# Patient Record
Sex: FEMALE | Race: WHITE | NOT HISPANIC OR LATINO | Employment: FULL TIME | URBAN - METROPOLITAN AREA
[De-identification: names, ages, dates, MRNs, and addresses within clinical notes are randomized per-mention and may not be internally consistent; named-entity substitution may affect disease eponyms.]

---

## 2023-10-05 ENCOUNTER — TELEPHONE (OUTPATIENT)
Dept: GASTROENTEROLOGY | Facility: CLINIC | Age: 58
End: 2023-10-05
Payer: COMMERCIAL

## 2023-10-05 NOTE — TELEPHONE ENCOUNTER
PT said she has been having trouble eating it hurts going down, feels stuck. She said she has an EGD on the 18th was wondering if there was anything [facility] could do for her before then.

## 2023-10-06 NOTE — TELEPHONE ENCOUNTER
If she's already taking PPI BID there's not much else I can do besides giving her some BMX solution which I will prescribe in case she wants it.

## 2023-10-11 DIAGNOSIS — R13.19 ESOPHAGEAL DYSPHAGIA: Primary | ICD-10-CM

## 2023-10-11 RX ORDER — SUCRALFATE 1 G/10ML
1 SUSPENSION ORAL
Qty: 1200 ML | Refills: 11 | Status: SHIPPED | OUTPATIENT
Start: 2023-10-11 | End: 2023-12-05 | Stop reason: ALTCHOICE

## 2023-10-17 ENCOUNTER — ANESTHESIA EVENT (OUTPATIENT)
Dept: OPERATING ROOM | Facility: HOSPITAL | Age: 58
End: 2023-10-17
Payer: COMMERCIAL

## 2023-10-17 PROBLEM — K30 FUNCTIONAL DYSPEPSIA: Status: ACTIVE | Noted: 2023-10-17

## 2023-10-17 PROBLEM — I49.9 CARDIAC RHYTHM DISORDER OR DISTURBANCE OR CHANGE: Status: ACTIVE | Noted: 2023-10-17

## 2023-10-17 PROBLEM — S90.30XA FOOT CONTUSION: Status: ACTIVE | Noted: 2023-10-17

## 2023-10-17 PROBLEM — M25.473 ANKLE EDEMA: Status: ACTIVE | Noted: 2023-10-17

## 2023-10-17 PROBLEM — R00.2 PALPITATIONS: Status: ACTIVE | Noted: 2023-10-17

## 2023-10-17 PROBLEM — R13.10 DYSPHAGIA: Status: ACTIVE | Noted: 2023-10-17

## 2023-10-17 PROBLEM — K21.9 GERD (GASTROESOPHAGEAL REFLUX DISEASE): Status: ACTIVE | Noted: 2023-10-17

## 2023-10-17 PROBLEM — I10 HYPERTENSIVE DISORDER: Status: ACTIVE | Noted: 2023-10-17
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
## 2023-10-17 PROBLEM — K90.89 BILE SALT-INDUCED DIARRHEA (HHS-HCC): Status: ACTIVE | Noted: 2023-10-17

## 2023-10-17 PROBLEM — R60.0 LOWER LEG EDEMA: Status: ACTIVE | Noted: 2023-10-17

## 2023-10-17 PROBLEM — S80.00XA CONTUSION OF KNEE: Status: ACTIVE | Noted: 2023-10-17

## 2023-10-17 PROBLEM — R07.9 CHEST PAIN: Status: ACTIVE | Noted: 2023-10-17

## 2023-10-17 PROBLEM — I10 BENIGN ESSENTIAL HYPERTENSION: Status: ACTIVE | Noted: 2023-10-17

## 2023-10-17 PROBLEM — S99.929A FOOT INJURY: Status: ACTIVE | Noted: 2023-10-17

## 2023-10-17 PROBLEM — M79.89 LEG SWELLING: Status: ACTIVE | Noted: 2023-10-17

## 2023-10-17 PROBLEM — R00.0 TACHYCARDIA: Status: ACTIVE | Noted: 2023-10-17

## 2023-10-17 PROBLEM — I20.9 ANGINA PECTORIS (CMS-HCC): Status: ACTIVE | Noted: 2023-10-17

## 2023-10-17 PROBLEM — K50.00 TERMINAL ILEITIS (MULTI): Status: ACTIVE | Noted: 2023-10-17
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
## 2023-10-17 RX ORDER — METOPROLOL TARTRATE 50 MG/1
0.5 TABLET ORAL EVERY MORNING
COMMUNITY
End: 2023-10-18

## 2023-10-17 RX ORDER — BUTALBITAL, ACETAMINOPHEN, CAFFEINE AND CODEINE PHOSPHATE 50; 325; 40; 30 MG/1; MG/1; MG/1; MG/1
1 CAPSULE ORAL EVERY 4 HOURS PRN
COMMUNITY

## 2023-10-17 RX ORDER — DILTIAZEM HYDROCHLORIDE 120 MG/1
1 TABLET, FILM COATED ORAL DAILY
COMMUNITY
End: 2024-03-25 | Stop reason: ALTCHOICE

## 2023-10-17 RX ORDER — MONTELUKAST SODIUM 4 MG/1
1 TABLET, CHEWABLE ORAL DAILY
COMMUNITY
Start: 2020-12-03 | End: 2024-03-25 | Stop reason: ALTCHOICE

## 2023-10-17 RX ORDER — TOPIRAMATE 25 MG/1
1 TABLET ORAL NIGHTLY
COMMUNITY
End: 2023-12-05 | Stop reason: ALTCHOICE

## 2023-10-17 RX ORDER — CITALOPRAM 40 MG/1
1 TABLET, FILM COATED ORAL DAILY
COMMUNITY
End: 2024-03-25 | Stop reason: ALTCHOICE

## 2023-10-17 RX ORDER — IBUPROFEN 200 MG
2 TABLET ORAL EVERY 6 HOURS PRN
COMMUNITY
End: 2024-05-15 | Stop reason: HOSPADM

## 2023-10-17 RX ORDER — INDOMETHACIN 25 MG/1
1 CAPSULE ORAL EVERY 8 HOURS
COMMUNITY
End: 2024-03-25 | Stop reason: ALTCHOICE

## 2023-10-17 RX ORDER — AMITRIPTYLINE HYDROCHLORIDE 10 MG/1
1 TABLET, FILM COATED ORAL NIGHTLY
COMMUNITY
Start: 2020-12-03 | End: 2024-03-25 | Stop reason: ALTCHOICE

## 2023-10-17 RX ORDER — PANTOPRAZOLE SODIUM 40 MG/1
40 TABLET, DELAYED RELEASE ORAL 2 TIMES DAILY
COMMUNITY

## 2023-10-17 RX ORDER — FLUTICASONE PROPIONATE 50 MCG
1 SPRAY, SUSPENSION (ML) NASAL 2 TIMES DAILY
COMMUNITY
Start: 2023-03-29 | End: 2024-03-25 | Stop reason: ALTCHOICE

## 2023-10-17 RX ORDER — IPRATROPIUM BROMIDE 21 UG/1
2 SPRAY, METERED NASAL 3 TIMES DAILY
COMMUNITY
Start: 2023-03-29 | End: 2024-03-25 | Stop reason: ALTCHOICE

## 2023-10-17 RX ORDER — CYCLOBENZAPRINE HCL 5 MG
1 TABLET ORAL AS NEEDED
COMMUNITY
End: 2024-05-15 | Stop reason: HOSPADM

## 2023-10-17 RX ORDER — ONDANSETRON 4 MG/1
1 TABLET, ORALLY DISINTEGRATING ORAL EVERY 6 HOURS
COMMUNITY
Start: 2023-03-27

## 2023-10-17 RX ORDER — CYCLOBENZAPRINE HCL 10 MG
TABLET ORAL
COMMUNITY
Start: 2020-12-03 | End: 2024-05-15 | Stop reason: HOSPADM

## 2023-10-17 RX ORDER — METOPROLOL SUCCINATE 50 MG/1
1 TABLET, EXTENDED RELEASE ORAL 2 TIMES DAILY
Status: ON HOLD | COMMUNITY
Start: 2018-02-13 | End: 2024-05-14 | Stop reason: ALTCHOICE

## 2023-10-17 RX ORDER — LORAZEPAM 0.5 MG/1
1 TABLET ORAL NIGHTLY PRN
COMMUNITY
End: 2024-03-25 | Stop reason: ALTCHOICE

## 2023-10-17 RX ORDER — BUMETANIDE 1 MG/1
TABLET ORAL
COMMUNITY
Start: 2020-12-03 | End: 2024-03-25 | Stop reason: ALTCHOICE

## 2023-10-17 RX ORDER — METOPROLOL TARTRATE 25 MG/1
25 TABLET, FILM COATED ORAL 2 TIMES DAILY
COMMUNITY
Start: 2023-08-25 | End: 2024-03-25 | Stop reason: ALTCHOICE

## 2023-10-17 RX ORDER — BUTALBITAL, ACETAMINOPHEN AND CAFFEINE 300; 40; 50 MG/1; MG/1; MG/1
CAPSULE ORAL
COMMUNITY
Start: 2020-12-03

## 2023-10-18 ENCOUNTER — HOSPITAL ENCOUNTER (OUTPATIENT)
Dept: OPERATING ROOM | Facility: HOSPITAL | Age: 58
Discharge: HOME | End: 2023-10-18
Payer: COMMERCIAL

## 2023-10-18 ENCOUNTER — ANESTHESIA (OUTPATIENT)
Dept: OPERATING ROOM | Facility: HOSPITAL | Age: 58
End: 2023-10-18
Payer: COMMERCIAL

## 2023-10-18 ENCOUNTER — APPOINTMENT (OUTPATIENT)
Dept: OPERATING ROOM | Facility: HOSPITAL | Age: 58
End: 2023-10-18
Payer: COMMERCIAL

## 2023-10-18 VITALS
WEIGHT: 175.71 LBS | BODY MASS INDEX: 35.42 KG/M2 | RESPIRATION RATE: 16 BRPM | DIASTOLIC BLOOD PRESSURE: 68 MMHG | HEART RATE: 72 BPM | OXYGEN SATURATION: 98 % | SYSTOLIC BLOOD PRESSURE: 117 MMHG | HEIGHT: 59 IN | TEMPERATURE: 97.5 F

## 2023-10-18 DIAGNOSIS — Z12.0 ENCOUNTER FOR SCREENING FOR MALIGNANT NEOPLASM OF STOMACH: ICD-10-CM

## 2023-10-18 PROCEDURE — A43239 PR EDG TRANSORAL BIOPSY SINGLE/MULTIPLE: Performed by: REGISTERED NURSE

## 2023-10-18 PROCEDURE — 3700000002 HC GENERAL ANESTHESIA TIME - EACH INCREMENTAL 1 MINUTE: Performed by: REGISTERED NURSE

## 2023-10-18 PROCEDURE — 88305 TISSUE EXAM BY PATHOLOGIST: CPT | Mod: TC | Performed by: INTERNAL MEDICINE

## 2023-10-18 PROCEDURE — 88305 TISSUE EXAM BY PATHOLOGIST: CPT | Mod: TC,SUR | Performed by: INTERNAL MEDICINE

## 2023-10-18 PROCEDURE — 2500000004 HC RX 250 GENERAL PHARMACY W/ HCPCS (ALT 636 FOR OP/ED): Performed by: REGISTERED NURSE

## 2023-10-18 PROCEDURE — 88305 TISSUE EXAM BY PATHOLOGIST: CPT | Performed by: PATHOLOGY

## 2023-10-18 PROCEDURE — 2500000004 HC RX 250 GENERAL PHARMACY W/ HCPCS (ALT 636 FOR OP/ED): Performed by: ANESTHESIOLOGY

## 2023-10-18 PROCEDURE — 7100000010 HC PHASE TWO TIME - EACH INCREMENTAL 1 MINUTE: Performed by: REGISTERED NURSE

## 2023-10-18 PROCEDURE — 7100000009 HC PHASE TWO TIME - INITIAL BASE CHARGE: Performed by: REGISTERED NURSE

## 2023-10-18 PROCEDURE — 43239 EGD BIOPSY SINGLE/MULTIPLE: CPT | Performed by: INTERNAL MEDICINE

## 2023-10-18 PROCEDURE — 3600000002 HC OR TIME - INITIAL BASE CHARGE - PROCEDURE LEVEL TWO: Performed by: REGISTERED NURSE

## 2023-10-18 PROCEDURE — 3600000007 HC OR TIME - EACH INCREMENTAL 1 MINUTE - PROCEDURE LEVEL TWO: Performed by: REGISTERED NURSE

## 2023-10-18 PROCEDURE — 3700000001 HC GENERAL ANESTHESIA TIME - INITIAL BASE CHARGE: Performed by: REGISTERED NURSE

## 2023-10-18 RX ORDER — PROPOFOL 10 MG/ML
INJECTION, EMULSION INTRAVENOUS AS NEEDED
Status: DISCONTINUED | OUTPATIENT
Start: 2023-10-18 | End: 2023-10-18

## 2023-10-18 RX ORDER — FENTANYL CITRATE 50 UG/ML
INJECTION, SOLUTION INTRAMUSCULAR; INTRAVENOUS AS NEEDED
Status: DISCONTINUED | OUTPATIENT
Start: 2023-10-18 | End: 2023-10-18

## 2023-10-18 RX ORDER — MIDAZOLAM HYDROCHLORIDE 1 MG/ML
INJECTION INTRAMUSCULAR; INTRAVENOUS AS NEEDED
Status: DISCONTINUED | OUTPATIENT
Start: 2023-10-18 | End: 2023-10-18

## 2023-10-18 RX ORDER — SODIUM CHLORIDE, SODIUM LACTATE, POTASSIUM CHLORIDE, CALCIUM CHLORIDE 600; 310; 30; 20 MG/100ML; MG/100ML; MG/100ML; MG/100ML
100 INJECTION, SOLUTION INTRAVENOUS CONTINUOUS
Status: DISCONTINUED | OUTPATIENT
Start: 2023-10-18 | End: 2023-10-20 | Stop reason: HOSPADM

## 2023-10-18 RX ADMIN — MIDAZOLAM HYDROCHLORIDE 2 MG: 1 INJECTION, SOLUTION INTRAMUSCULAR; INTRAVENOUS at 13:55

## 2023-10-18 RX ADMIN — FENTANYL CITRATE 50 MCG: 50 INJECTION, SOLUTION INTRAMUSCULAR; INTRAVENOUS at 13:55

## 2023-10-18 RX ADMIN — SODIUM CHLORIDE, POTASSIUM CHLORIDE, SODIUM LACTATE AND CALCIUM CHLORIDE 100 ML/HR: 600; 310; 30; 20 INJECTION, SOLUTION INTRAVENOUS at 12:14

## 2023-10-18 RX ADMIN — PROPOFOL 80 MG: 10 INJECTION, EMULSION INTRAVENOUS at 13:56

## 2023-10-18 RX ADMIN — PROPOFOL 20 MG: 10 INJECTION, EMULSION INTRAVENOUS at 14:03

## 2023-10-18 RX ADMIN — PROPOFOL 30 MG: 10 INJECTION, EMULSION INTRAVENOUS at 14:01

## 2023-10-18 ASSESSMENT — PAIN SCALES - GENERAL
PAIN_LEVEL: 0
PAINLEVEL_OUTOF10: 0 - NO PAIN

## 2023-10-18 ASSESSMENT — PAIN - FUNCTIONAL ASSESSMENT: PAIN_FUNCTIONAL_ASSESSMENT: 0-10

## 2023-10-18 ASSESSMENT — ENCOUNTER SYMPTOMS
DIARRHEA: 1
ROS GI COMMENTS: DYSPHAGIA, ODYNOPHAGIA
ABDOMINAL PAIN: 1

## 2023-10-18 ASSESSMENT — COLUMBIA-SUICIDE SEVERITY RATING SCALE - C-SSRS
1. IN THE PAST MONTH, HAVE YOU WISHED YOU WERE DEAD OR WISHED YOU COULD GO TO SLEEP AND NOT WAKE UP?: NO
2. HAVE YOU ACTUALLY HAD ANY THOUGHTS OF KILLING YOURSELF?: NO
6. HAVE YOU EVER DONE ANYTHING, STARTED TO DO ANYTHING, OR PREPARED TO DO ANYTHING TO END YOUR LIFE?: NO

## 2023-10-18 NOTE — ANESTHESIA POSTPROCEDURE EVALUATION
Patient: Marleni Davey    Procedure Summary       Date: 10/18/23 Room / Location: [facility] OR    Anesthesia Start: 1354 Anesthesia Stop: 1414    Procedure: EGD Diagnosis: Encounter for screening for malignant neoplasm of stomach    Scheduled Providers: Gwen Niño MD Responsible Provider: TEREZA Mccray    Anesthesia Type: MAC ASA Status: 2            Anesthesia Type: MAC  Anesthesia Post Evaluation    Patient location during evaluation: bedside  Patient participation: complete - patient participated  Level of consciousness: awake and alert  Pain score: 0  Airway patency: patent  Cardiovascular status: acceptable and hemodynamically stable  Respiratory status: acceptable and room air  Hydration status: acceptable        There were no known notable events for this encounter.

## 2023-10-18 NOTE — ANESTHESIA PREPROCEDURE EVALUATION
Patient: Marleni Davey    Procedure Information       Anesthesia Start Date/Time: 10/18/23 1354    Scheduled providers: Gwen Niño MD    Procedure: EGD    Location: [facility] OR            Relevant Problems   Cardiovascular   (+) Angina pectoris (CMS/HCC)   (+) Benign essential hypertension   (+) Cardiac rhythm disorder or disturbance or change   (+) Chest pain   (+) Hypertensive disorder      GI   (+) GERD (gastroesophageal reflux disease)      Neuro/Psych   (+) Anxiety       Clinical information reviewed:    Allergies                NPO Detail:  NPO/Void Status  Date of Last Solid: 10/17/23  Time of Last Solid: 1900  Last Intake Type: Food         Physical Exam    Airway  Mallampati: II  Neck ROM: full     Cardiovascular - normal exam     Dental - normal exam     Pulmonary - normal exam     Abdominal - normal exam             Anesthesia Plan    ASA 2     MAC     intravenous induction   Anesthetic plan and risks discussed with patient.

## 2023-10-18 NOTE — DISCHARGE INSTRUCTIONS

## 2023-10-18 NOTE — H&P
"History Of Present Illness  Marleni Davey is a 58 y.o. female presenting with with abdominal pain, dysphagia/odynophagia.  Previous EGD in 2020 at [facility].  Biopsy was unremarkable.  SP cholecystectomy     Past Medical History  No past medical history on file.    Surgical History  No past surgical history on file.     Social History  She has no history on file for tobacco use, alcohol use, and drug use.    Family History  No family history on file.     Allergies  Eletriptan, Topiramate, Zolmitriptan, Trazodone, Aspirin-acetaminophen-caffeine, Sumatriptan, and Triamcinolone    Review of Systems   Gastrointestinal:  Positive for abdominal pain and diarrhea.        Dysphagia, odynophagia        Physical Exam  Cardiovascular:      Rate and Rhythm: Normal rate and regular rhythm.   Pulmonary:      Effort: Pulmonary effort is normal.      Breath sounds: Normal breath sounds.   Neurological:      Mental Status: She is alert and oriented to person, place, and time.          Last Recorded Vitals  Blood pressure 118/75, pulse 68, temperature 37.2 °C (99 °F), resp. rate 16, height 1.499 m (4' 11\"), weight 79.7 kg (175 lb 11.3 oz), SpO2 100 %.    Relevant Results             Assessment/Plan   Active Problems:  There are no active Hospital Problems.      Abdominal pain  Diarrhea  Dysphagia, odynophagia.    EGD             Gwen Niño MD    "

## 2023-10-24 LAB
LABORATORY COMMENT REPORT: NORMAL
PATH REPORT.COMMENTS IMP SPEC: NORMAL
PATH REPORT.FINAL DX SPEC: NORMAL
PATH REPORT.GROSS SPEC: NORMAL
PATH REPORT.RELEVANT HX SPEC: NORMAL
PATH REPORT.TOTAL CANCER: NORMAL

## 2023-10-25 DIAGNOSIS — R13.19 ESOPHAGEAL DYSPHAGIA: Primary | ICD-10-CM

## 2023-10-25 NOTE — PROGRESS NOTES
Discussed EGD findings with patient. Small HH, gastritis. Biopsies normal. She is taking PPI but not carafate as it made her nauseous and did not help symptoms. She is chewing her food carefully and not taking in large amounts at a time. We will plan for esophagram to further evaluate dysphagia symptoms.

## 2023-11-14 ENCOUNTER — HOSPITAL ENCOUNTER (OUTPATIENT)
Dept: RADIOLOGY | Facility: HOSPITAL | Age: 58
Discharge: HOME | End: 2023-11-14
Payer: COMMERCIAL

## 2023-11-14 DIAGNOSIS — R13.19 ESOPHAGEAL DYSPHAGIA: ICD-10-CM

## 2023-11-14 PROCEDURE — 3430000001 HC RX 343 DIAGNOSTIC RADIOPHARMACEUTICALS: Performed by: PHYSICIAN ASSISTANT

## 2023-11-14 PROCEDURE — 2500000001 HC RX 250 WO HCPCS SELF ADMINISTERED DRUGS (ALT 637 FOR MEDICARE OP): Performed by: PHYSICIAN ASSISTANT

## 2023-11-14 PROCEDURE — 74221 X-RAY XM ESOPHAGUS 2CNTRST: CPT | Performed by: STUDENT IN AN ORGANIZED HEALTH CARE EDUCATION/TRAINING PROGRAM

## 2023-11-14 PROCEDURE — A9698 NON-RAD CONTRAST MATERIALNOC: HCPCS | Performed by: PHYSICIAN ASSISTANT

## 2023-11-14 PROCEDURE — 74220 X-RAY XM ESOPHAGUS 1CNTRST: CPT

## 2023-11-14 RX ADMIN — ANTACID/ANTIFLATULENT 1 PACKET: 380; 550; 10; 10 GRANULE, EFFERVESCENT ORAL at 09:29

## 2023-11-14 RX ADMIN — BARIUM SULFATE 700 MG: 700 TABLET ORAL at 09:28

## 2023-11-14 RX ADMIN — BARIUM SULFATE 50 ML: 980 POWDER, FOR SUSPENSION ORAL at 09:27

## 2023-11-14 RX ADMIN — BARIUM SULFATE 70 ML: 960 POWDER, FOR SUSPENSION ORAL at 09:31

## 2023-11-15 DIAGNOSIS — K44.9 HIATAL HERNIA: Primary | ICD-10-CM

## 2023-11-15 NOTE — PROGRESS NOTES
Discussed esophagram with patient. Small hiatal hernia with mild esophageal reflux, however she is on maximum dose of PPI therapy. Therefore, I recommend seeing Dr. Jones to discuss hiatal hernia repair and consider [facility] for motility testing. She is in agreement. Referral placed. She also reports that there was an enlarged lymph node near stomach which was removed w/ her GB. This was at [facility]. I asked she send records over for me to review further and consider repeat CT scan.

## 2023-12-05 ENCOUNTER — OFFICE VISIT (OUTPATIENT)
Dept: GASTROENTEROLOGY | Facility: CLINIC | Age: 58
End: 2023-12-05
Payer: COMMERCIAL

## 2023-12-05 VITALS — BODY MASS INDEX: 36.08 KG/M2 | HEIGHT: 59 IN | WEIGHT: 179 LBS

## 2023-12-05 DIAGNOSIS — R10.84 GENERALIZED ABDOMINAL PAIN: Primary | ICD-10-CM

## 2023-12-05 PROCEDURE — 99214 OFFICE O/P EST MOD 30 MIN: CPT | Performed by: PHYSICIAN ASSISTANT

## 2023-12-05 NOTE — PROGRESS NOTES
Subjective   Patient ID: Marleni Davey is a 58 y.o. female who presents for Follow-up (EGD ).  HPI  58-year-old female presents for follow-up dysphagia.  She has been on PPI twice daily with insufficient response.  She underwent an EGD last month which noted normal duodenum, normal mucosa in the esophagus, polyp in the stomach.  Small hiatal hernia.  Biopsies were within normal limits.  She reports ongoing abdominal pain.  She also has dysphagia as well.  She has made many dietary modifications but has not noticed a significant difference.  Food is still getting hung up in her esophagus and feels like it will take hours to go down.  She underwent an esophagram which showed small hiatal hernia with breakthrough reflux symptoms despite PPI twice daily.  We discussed options such as transition medication or looking into surgical options and she is interested in meeting with a general surgeon.  We also discussed her CT scans from 2020 in 2021.  There was a couple of lymph nodes around her duodenal region that were 2 cm or larger.  This was not noted on her most recent CT scan in February 2023, however there is a lot of inflammation in her abdomen at that point and may have been missed.  She does report abdominal pain which is ongoing.  Abdominal pain-Yes      Bloating-Yes  Abdominal swelling-No     Burping-Yes         Trouble swallowing-Yes         Painful swallowing-No     Feeling full after small meal-Yes     Heartburn-Yes      Nausea-Yes              Regurgitation-Yes  Vomiting-No  Vomiting blood-N/A  Vomiting coffee grounds-N/A    Constipation-Yes  Diarrhea-No  Fecal incontinence-No  Fecal urgency-No   BRBPR-No  Black stools-No  Maroon stools-No   Unintentional weight loss-No   Have you had a Colonoscopy-Yes 3/20/23  Have you had an EGD-Yes 10/18/23    Objective   Physical Exam  @Constitutional: well nourished, well appearing. NAD. Alert and cooperative  Skin: no jaundice   Eyes: anicteric, normal  conjunctiva  ENT: MMM  Pulmonary: easy and nonlabored on RA  Abdomen: soft, NT, ND. No ascites.  MSK: MAEx4  Extremities: no edema  Neuro: aaox3  Psych: appropriate mood and behavior     No visits with results within 1 Month(s) from this visit.   Latest known visit with results is:   Hospital Outpatient Visit on 10/18/2023   Component Date Value Ref Range Status    Case Report 10/18/2023    Final                    Value:Surgical Pathology                                Case: [id number]                                  Authorizing Provider:  Gwen Niño MD    Collected:           10/18/2023 1400              Ordering Location:     [facility]   Received:            10/18/2023 1422                                     OR                                                                           Pathologist:           Elgin Cunha MD                                                           Specimens:   A) - STOMACH ANTRUM BIOPSY                                                                          B) - ESOPHAGOGASTRIC JUNCTION BIOPSY                                                                C) - ESOPHAGUS MID BIOPSY, ESOPHAGUS MID BOPSY                                             FINAL DIAGNOSIS 10/18/2023    Final                    Value:This result contains rich text formatting which cannot be displayed here.      10/18/2023    Final                    Value:This result contains rich text formatting which cannot be displayed here.    Comment 10/18/2023    Final                    Value:This result contains rich text formatting which cannot be displayed here.    Clinical History 10/18/2023    Final                    Value:This result contains rich text formatting which cannot be displayed here.    Gross Description 10/18/2023    Final                    Value:This result contains rich text formatting which cannot be displayed here.       Assessment/Plan     58-year-old female  presents to GI clinic for follow-up dysphagia.  Concern hiatal hernia is the potential cause.    -Refer to general surgery to discuss surgical options for hiatal hernia; discussed she may need manometry.  -Continue PPI twice daily  -CT scan abdomen pelvis with IV contrast for abdominal pain and to provide surveillance for lymphadenopathy around upper abdomen.

## 2023-12-06 ENCOUNTER — OFFICE VISIT (OUTPATIENT)
Dept: SURGERY | Facility: CLINIC | Age: 58
End: 2023-12-06
Payer: COMMERCIAL

## 2023-12-06 VITALS
DIASTOLIC BLOOD PRESSURE: 85 MMHG | HEIGHT: 59 IN | SYSTOLIC BLOOD PRESSURE: 125 MMHG | OXYGEN SATURATION: 94 % | HEART RATE: 73 BPM | BODY MASS INDEX: 36.08 KG/M2 | WEIGHT: 179 LBS

## 2023-12-06 DIAGNOSIS — K21.9 HIATAL HERNIA WITH GASTROESOPHAGEAL REFLUX: Primary | ICD-10-CM

## 2023-12-06 DIAGNOSIS — K44.9 HIATAL HERNIA WITH GASTROESOPHAGEAL REFLUX: Primary | ICD-10-CM

## 2023-12-06 DIAGNOSIS — K44.9 HIATAL HERNIA: ICD-10-CM

## 2023-12-06 PROCEDURE — 99204 OFFICE O/P NEW MOD 45 MIN: CPT | Performed by: SURGERY

## 2023-12-06 PROCEDURE — 3074F SYST BP LT 130 MM HG: CPT | Performed by: SURGERY

## 2023-12-06 PROCEDURE — 3079F DIAST BP 80-89 MM HG: CPT | Performed by: SURGERY

## 2023-12-06 NOTE — PROGRESS NOTES
General Surgery History and Physical    Referring Provider:  MD Hosea Hunter Lauren E, PA*     Chief Complaint:  Chief Complaint   Patient presents with    New Patient Visit     Evaluation of hiatal hernia-Egd done on 10-18-23       History of Present Illness:  This is a 58 y.o. female who presents with refractory gastroesophageal reflux disease symptoms.  She reports that she has been dealing with gastroesophageal reflux disease for many years.  She reports that she originally had complete control of her symptoms with proton pump inhibitors.  She reports that a few months ago she began having breakthrough symptoms, and proceeded with doubling her dose.  She now is taking 40 mg twice a day.  However, she still has some symptoms.  Her symptoms now include dysphagia, where food feels like it gets stuck in her lower esophagus.  She still has heartburn and reflux.  She is unable to eat anything acidic.  She reports that she sleeps in a recliner.  She is already avoiding spicy food, acidic food, caffeine, alcohol, tobacco and large meals.    Past Medical History:  Gastroesophageal reflux disease    Past Surgical History:  Cholecystectomy with lymph node resection  Tubal ligation  Tonsillectomy  Breast biopsy    Medications:  Current Outpatient Medications   Medication Instructions    ACETAMINOPHEN ORAL 2 tablets, oral, Daily PRN    amitriptyline (Elavil) 10 mg tablet 1 tablet, oral, Nightly    bumetanide (Bumex) 1 mg tablet oral    butalbitaL-acetaminop-caf-cod (Fioricet W/Codeine) -45-30 mg capsule 1 capsule, oral, Every 4 hours PRN    butalbital-acetaminophen-caff (Fioricet) -40 mg capsule oral    citalopram (CeleXA) 40 mg tablet 1 tablet, oral, Daily    colestipol (Colestid) 1 gram tablet 1 tablet, oral, Daily    cyclobenzaprine (Flexeril) 10 mg tablet oral    cyclobenzaprine (Flexeril) 5 mg tablet 1 tablet, oral, As needed    dilTIAZem (Cardizem) 120 mg immediate release tablet 1 tablet,  oral, Daily, Before meal    fluticasone (Flonase) 50 mcg/actuation nasal spray 1 spray, 2 times daily    ibuprofen 200 mg tablet 2 tablets, oral, Every 6 hours PRN    indomethacin (Indocin) 25 mg capsule 1 capsule, oral, Every 8 hours, With food    ipratropium (Atrovent) 21 mcg (0.03 %) nasal spray 2 sprays, 3 times daily    LORazepam (Ativan) 0.5 mg tablet 1 tablet, oral, Nightly PRN    metoprolol succinate XL (Toprol-XL) 50 mg 24 hr tablet 1 tablet, oral, Daily    metoprolol tartrate (LOPRESSOR) 25 mg, oral, 2 times daily    nortriptyline (PAMELOR) 10 mg, oral, Nightly    ondansetron ODT (Zofran-ODT) 4 mg disintegrating tablet 1 tablet, oral, Every 6 hours    pantoprazole (PROTONIX) 40 mg, oral, 2 times daily        Allergies:  Allergies   Allergen Reactions    Eletriptan Shortness of breath    Topiramate Other     Change in mental status      Zolmitriptan GI Upset    Trazodone Other     Tingling fingers      Aspirin-Acetaminophen-Caffeine Unknown    Sumatriptan Unknown    Triamcinolone Rash        Family History:  Chronic kidney disease  Myocardial infarction  Hypertension    Social History:  .  Works as a .  Denies tobacco and illicits.  Drinks alcohol twice a month       Review of Systems:  A complete 12 point review of systems was performed and is negative except as noted in the history of present illness.      Vital Signs:  Vitals:    12/06/23 1432   BP: 125/85   Pulse: 73   SpO2: 94%          Physical Exam:  General: No acute distress. Sitting up in bed.   Neuro: Alert and oriented ×3. Follows commands.  Head: Atraumatic  Eyes: Pupils equal reactive to light. Extraocular motions intact.  Ears: Hears normal speaking voice.  Mouth, Nose, Throat: Mucous membranes moist.  Normal dentition.  Neck: Supple. No appreciable masses.  Chest: No crepitus.  No appreciable scars.  Heart: Regular rate and rhythm.  Lung: Clear to auscultation bilaterally.  Vascular: No carotid bruits.  Palpable radial  pulses bilaterally.  Abdomen: Soft. Nondistended. Nontender.  Well-healed laparoscopic incisions  Musculoskeletal: Moves all extremities.  Normal range of motion.  Lymphatic: No palpable lymph nodes.  Skin: No rashes or lesions.  Psychological: Normal affect      Laboratory Values:  None      Imaging:  I have personally reviewed the images and the radiologist's report.  Esophagram: Small hiatal hernia with mild reflux    EGD: Tiny hiatal hernia      Assessment:  This is a 58 y.o. female who presents with dysphagia and gastroesophageal reflux disease refractory to maximum dose proton pump inhibitors as well as the appropriate behavioral changes.  We discussed that in general this would be an indication for a hiatal hernia repair with toupee fundoplication.  However, we would need to proceed with an EMOT and an Bravo first.  She would also need to lose weight to a BMI of less than 35 prior to surgery.  She plans to do so.      Plan:  -- EMOT  -- Bravo while off of antacids for 1 week.  -- Weight loss to a BMI less than 35  -- Follow up to review results.      Kvng Jones MD  General Surgery  Office: [phone]  Fax:     [phone]  3:33 PM   12/06/23

## 2023-12-18 PROCEDURE — 82565 ASSAY OF CREATININE: CPT | Mod: OUT

## 2023-12-19 ENCOUNTER — HOSPITAL ENCOUNTER (OUTPATIENT)
Dept: RADIOLOGY | Facility: HOSPITAL | Age: 58
Discharge: HOME | End: 2023-12-19
Payer: COMMERCIAL

## 2023-12-19 DIAGNOSIS — R10.84 GENERALIZED ABDOMINAL PAIN: ICD-10-CM

## 2023-12-19 PROCEDURE — 74177 CT ABD & PELVIS W/CONTRAST: CPT

## 2023-12-19 PROCEDURE — 74177 CT ABD & PELVIS W/CONTRAST: CPT | Performed by: RADIOLOGY

## 2023-12-19 PROCEDURE — 2550000001 HC RX 255 CONTRASTS: Performed by: PHYSICIAN ASSISTANT

## 2023-12-19 RX ADMIN — IOHEXOL 75 ML: 350 INJECTION, SOLUTION INTRAVENOUS at 09:22

## 2024-01-04 ENCOUNTER — LAB REQUISITION (OUTPATIENT)
Dept: LAB | Facility: HOSPITAL | Age: 59
End: 2024-01-04
Payer: COMMERCIAL

## 2024-01-04 LAB
CREAT SERPL-MCNC: 0.7 MG/DL (ref 0.6–1.3)
GFR SERPL CREATININE-BSD FRML MDRD: >60 ML/MIN/1.73M*2

## 2024-01-06 ENCOUNTER — HOSPITAL ENCOUNTER (EMERGENCY)
Facility: HOSPITAL | Age: 59
Discharge: HOME | End: 2024-01-06
Payer: COMMERCIAL

## 2024-01-06 VITALS
TEMPERATURE: 97.2 F | WEIGHT: 179 LBS | SYSTOLIC BLOOD PRESSURE: 130 MMHG | DIASTOLIC BLOOD PRESSURE: 99 MMHG | OXYGEN SATURATION: 95 % | BODY MASS INDEX: 36.08 KG/M2 | HEART RATE: 84 BPM | HEIGHT: 59 IN | RESPIRATION RATE: 16 BRPM

## 2024-01-06 DIAGNOSIS — S61.012A LACERATION OF LEFT THUMB WITHOUT FOREIGN BODY WITHOUT DAMAGE TO NAIL, INITIAL ENCOUNTER: Primary | ICD-10-CM

## 2024-01-06 PROCEDURE — 12002 RPR S/N/AX/GEN/TRNK2.6-7.5CM: CPT

## 2024-01-06 PROCEDURE — 99282 EMERGENCY DEPT VISIT SF MDM: CPT

## 2024-01-06 PROCEDURE — 99283 EMERGENCY DEPT VISIT LOW MDM: CPT | Mod: 25

## 2024-01-06 ASSESSMENT — LIFESTYLE VARIABLES
HAVE YOU EVER FELT YOU SHOULD CUT DOWN ON YOUR DRINKING: NO
EVER FELT BAD OR GUILTY ABOUT YOUR DRINKING: NO
HAVE PEOPLE ANNOYED YOU BY CRITICIZING YOUR DRINKING: NO
EVER HAD A DRINK FIRST THING IN THE MORNING TO STEADY YOUR NERVES TO GET RID OF A HANGOVER: NO
REASON UNABLE TO ASSESS: YES

## 2024-01-06 ASSESSMENT — PAIN SCALES - GENERAL: PAINLEVEL_OUTOF10: 6

## 2024-01-06 ASSESSMENT — COLUMBIA-SUICIDE SEVERITY RATING SCALE - C-SSRS
2. HAVE YOU ACTUALLY HAD ANY THOUGHTS OF KILLING YOURSELF?: NO
6. HAVE YOU EVER DONE ANYTHING, STARTED TO DO ANYTHING, OR PREPARED TO DO ANYTHING TO END YOUR LIFE?: NO
1. IN THE PAST MONTH, HAVE YOU WISHED YOU WERE DEAD OR WISHED YOU COULD GO TO SLEEP AND NOT WAKE UP?: NO

## 2024-01-06 ASSESSMENT — PAIN - FUNCTIONAL ASSESSMENT: PAIN_FUNCTIONAL_ASSESSMENT: 0-10

## 2024-01-06 ASSESSMENT — PAIN DESCRIPTION - ORIENTATION: ORIENTATION: LEFT

## 2024-01-06 ASSESSMENT — PAIN DESCRIPTION - DESCRIPTORS: DESCRIPTORS: THROBBING

## 2024-01-06 ASSESSMENT — PAIN DESCRIPTION - LOCATION: LOCATION: FINGER (COMMENT WHICH ONE)

## 2024-01-06 ASSESSMENT — PAIN DESCRIPTION - PAIN TYPE: TYPE: ACUTE PAIN

## 2024-01-07 NOTE — ED PROVIDER NOTES
HPI   Chief Complaint   Patient presents with    Laceration       History of present illness:  58-year-old female presents to the emergency room for complaints of thumb laceration.  The patient states that she was using a new knife to cut some steak when she is holding the meat and unfortunately the knife slipped through and cut the tip of her finger.  She is having difficulty controlling the bleeding at home.  She states she last had a tetanus shot just a month ago.  She states that she has no other injuries time takes medications for hypertension and hyperlipidemia.  She denies any other symptoms at time.    Social history: Negative for alcohol and drug use.    Review of systems:   Gen.: No weight loss, fatigue, anorexia, insomnia, fever.   Eyes: No vision loss, double vision  ENT: No pharyngitis, neck pain  Cardiac: No chest pain, palpitations, syncope, near syncope.   Pulmonary: No shortness of breath, cough, hemoptysis.   Heme/lymph: No swollen glands, fever, bleeding.   GI: No abdominal pain, change in bowel habits, melena, hematemesis, hematochezia, nausea, vomiting, diarrhea.   : No discharge, dysuria, frequency, urgency, hematuria.   Musculoskeletal: No limb pain, joint pain, joint swelling.   Skin: No rashes.   Review of systems is otherwise negative unless stated above or in history of present illness.        Physical exam:  General: Vitals noted, no distress. Afebrile.   EENT: No lymphadenopathy   Cardiac: Regular, rate, rhythm, no murmur.   Pulmonary: Lungs clear bilaterally with good aeration. No adventitious breath sounds.   Abdomen: Soft, nonsurgical. Nontender. No peritoneal signs. Normoactive bowel sounds.   Extremities: No peripheral edema. 3 cm laceration is present on the pad of the left thumb does not affect the nailbed at this time, it is not gaping at this time, is not actively bleeding at this time  Skin: No rash.   Neuro: No focal neurologic deficits      Medical decision making:    Testing: Clinical exam  Treatment: Skin glue used to close the wound 3 cm laceration is present on the pad of the left thumb does not affect the nailbed at this time, it is not gaping at this time, is not actively bleeding at this time  Reevaluation:   Plan: Home-going.  Discussed differential. Will follow-up with the primary physician in the next 2-3 days. Return if worse. They understand return precautions and discharge instructions. Patient and family/friend/caregiver are in agreement with this plan. 58-year-old female presents to the emergency room for complaints of thumb laceration.  The patient states that she was using a new knife to cut some steak when she is holding the meat and unfortunately the knife slipped through and cut the tip of her finger.  She is having difficulty controlling the bleeding at home.  She states she last had a tetanus shot just a month ago.  She states that she has no other injuries time takes medications for hypertension and hyperlipidemia.  She denies any other symptoms at time. 3 cm laceration is present on the pad of the left thumb does not affect the nailbed at this time, it is not gaping at this time, is not actively bleeding at this time.  I explained to the patient appropriate home care at this time and encouraged her to follow-up with primary care doctor if she had any further issues.  Impression:   1.  Thumb laceration            History provided by:  Patient   used: No                        Angie Coma Scale Score: 15                  Patient History   No past medical history on file.  No past surgical history on file.  No family history on file.  Social History     Tobacco Use    Smoking status: Never    Smokeless tobacco: Never   Substance Use Topics    Alcohol use: Not on file    Drug use: Not on file       Physical Exam   ED Triage Vitals [01/06/24 2004]   Temp Heart Rate Resp BP   36.2 °C (97.2 °F) 84 16 (!) 130/99      SpO2 Temp Source Heart  Rate Source Patient Position   95 % Temporal Monitor --      BP Location FiO2 (%)     Left arm --       Physical Exam    ED Course & MDM   Diagnoses as of 01/06/24 2032   Laceration of left thumb without foreign body without damage to nail, initial encounter       Medical Decision Making      Procedure  Procedures     Gil Arellano PA-C  01/06/24 2033

## 2024-01-07 NOTE — ED PROCEDURE NOTE
Procedure  Laceration Repair    Performed by: Gil Arellano PA-C  Authorized by: Gil Arellano PA-C    Consent:     Consent obtained:  Verbal and written    Consent given by:  Patient    Risks discussed:  Infection and pain  Universal protocol:     Patient identity confirmed:  Verbally with patient and arm band  Anesthesia:     Anesthesia method:  None  Laceration details:     Location:  Finger    Finger location:  L thumb    Length (cm):  3    Depth (mm):  1  Exploration:     Limited defect created (wound extended): no    Treatment:     Area cleansed with:  Shur-Clens and saline    Visualized foreign bodies/material removed: no      Debridement:  None    Undermining:  None  Skin repair:     Repair method:  Tissue adhesive  Approximation:     Approximation:  Close               Gil Arellano PA-C  01/06/24 2033

## 2024-02-27 DIAGNOSIS — K21.9 GASTROESOPHAGEAL REFLUX DISEASE WITHOUT ESOPHAGITIS: Primary | ICD-10-CM

## 2024-02-27 RX ORDER — FAMOTIDINE 40 MG/1
40 TABLET, FILM COATED ORAL DAILY
Qty: 30 TABLET | Refills: 1 | Status: SHIPPED | OUTPATIENT
Start: 2024-02-27 | End: 2024-04-27

## 2024-03-09 DIAGNOSIS — K30 FUNCTIONAL DYSPEPSIA: ICD-10-CM

## 2024-03-11 RX ORDER — NORTRIPTYLINE HYDROCHLORIDE 10 MG/1
10 CAPSULE ORAL NIGHTLY
Qty: 30 CAPSULE | Refills: 0 | Status: SHIPPED | OUTPATIENT
Start: 2024-03-11 | End: 2024-05-07 | Stop reason: SDUPTHER

## 2024-03-25 ENCOUNTER — ANESTHESIA EVENT (OUTPATIENT)
Dept: GASTROENTEROLOGY | Facility: HOSPITAL | Age: 59
End: 2024-03-25
Payer: COMMERCIAL

## 2024-03-25 ENCOUNTER — ANESTHESIA (OUTPATIENT)
Dept: GASTROENTEROLOGY | Facility: HOSPITAL | Age: 59
End: 2024-03-25
Payer: COMMERCIAL

## 2024-03-25 ENCOUNTER — APPOINTMENT (OUTPATIENT)
Dept: GASTROENTEROLOGY | Facility: HOSPITAL | Age: 59
End: 2024-03-25
Payer: COMMERCIAL

## 2024-03-25 ENCOUNTER — HOSPITAL ENCOUNTER (OUTPATIENT)
Dept: GASTROENTEROLOGY | Facility: HOSPITAL | Age: 59
Setting detail: OUTPATIENT SURGERY
Discharge: HOME | End: 2024-03-25
Payer: COMMERCIAL

## 2024-03-25 VITALS
SYSTOLIC BLOOD PRESSURE: 117 MMHG | OXYGEN SATURATION: 98 % | TEMPERATURE: 97 F | RESPIRATION RATE: 16 BRPM | HEART RATE: 66 BPM | DIASTOLIC BLOOD PRESSURE: 72 MMHG

## 2024-03-25 VITALS
RESPIRATION RATE: 16 BRPM | BODY MASS INDEX: 35.16 KG/M2 | HEART RATE: 64 BPM | OXYGEN SATURATION: 96 % | SYSTOLIC BLOOD PRESSURE: 104 MMHG | HEIGHT: 59 IN | WEIGHT: 174.38 LBS | DIASTOLIC BLOOD PRESSURE: 66 MMHG | TEMPERATURE: 97.7 F

## 2024-03-25 DIAGNOSIS — K44.9 HIATAL HERNIA WITH GASTROESOPHAGEAL REFLUX: ICD-10-CM

## 2024-03-25 DIAGNOSIS — K44.9 HIATAL HERNIA: ICD-10-CM

## 2024-03-25 DIAGNOSIS — K21.9 HIATAL HERNIA WITH GASTROESOPHAGEAL REFLUX: ICD-10-CM

## 2024-03-25 PROCEDURE — A43235 PR ESOPHAGOGASTRODUODENOSCOPY TRANSORAL DIAGNOSTIC: Performed by: ANESTHESIOLOGY

## 2024-03-25 PROCEDURE — A43235 PR ESOPHAGOGASTRODUODENOSCOPY TRANSORAL DIAGNOSTIC: Performed by: ANESTHESIOLOGIST ASSISTANT

## 2024-03-25 PROCEDURE — 91035 G-ESOPH REFLX TST W/ELECTROD: CPT | Performed by: INTERNAL MEDICINE

## 2024-03-25 PROCEDURE — 3700000002 HC GENERAL ANESTHESIA TIME - EACH INCREMENTAL 1 MINUTE

## 2024-03-25 PROCEDURE — 2500000004 HC RX 250 GENERAL PHARMACY W/ HCPCS (ALT 636 FOR OP/ED): Performed by: ANESTHESIOLOGIST ASSISTANT

## 2024-03-25 PROCEDURE — 2720000007 HC OR 272 NO HCPCS

## 2024-03-25 PROCEDURE — 2500000005 HC RX 250 GENERAL PHARMACY W/O HCPCS: Performed by: ANESTHESIOLOGIST ASSISTANT

## 2024-03-25 PROCEDURE — 7100000009 HC PHASE TWO TIME - INITIAL BASE CHARGE

## 2024-03-25 PROCEDURE — 2500000004 HC RX 250 GENERAL PHARMACY W/ HCPCS (ALT 636 FOR OP/ED): Performed by: INTERNAL MEDICINE

## 2024-03-25 PROCEDURE — 91010 ESOPHAGUS MOTILITY STUDY: CPT | Performed by: SURGERY

## 2024-03-25 PROCEDURE — 91010 ESOPHAGUS MOTILITY STUDY: CPT

## 2024-03-25 PROCEDURE — 7100000010 HC PHASE TWO TIME - EACH INCREMENTAL 1 MINUTE

## 2024-03-25 PROCEDURE — 2500000005 HC RX 250 GENERAL PHARMACY W/O HCPCS: Performed by: SURGERY

## 2024-03-25 PROCEDURE — 3700000001 HC GENERAL ANESTHESIA TIME - INITIAL BASE CHARGE

## 2024-03-25 PROCEDURE — 43235 EGD DIAGNOSTIC BRUSH WASH: CPT | Performed by: INTERNAL MEDICINE

## 2024-03-25 RX ORDER — LIDOCAINE HYDROCHLORIDE 20 MG/ML
1 JELLY TOPICAL ONCE
Status: COMPLETED | OUTPATIENT
Start: 2024-03-25 | End: 2024-03-25

## 2024-03-25 RX ORDER — PROPOFOL 10 MG/ML
INJECTION, EMULSION INTRAVENOUS AS NEEDED
Status: DISCONTINUED | OUTPATIENT
Start: 2024-03-25 | End: 2024-03-25

## 2024-03-25 RX ORDER — LIDOCAINE HYDROCHLORIDE 20 MG/ML
INJECTION, SOLUTION INFILTRATION; PERINEURAL AS NEEDED
Status: DISCONTINUED | OUTPATIENT
Start: 2024-03-25 | End: 2024-03-25

## 2024-03-25 RX ORDER — MIDAZOLAM HYDROCHLORIDE 1 MG/ML
INJECTION INTRAMUSCULAR; INTRAVENOUS AS NEEDED
Status: DISCONTINUED | OUTPATIENT
Start: 2024-03-25 | End: 2024-03-25

## 2024-03-25 RX ORDER — SODIUM CHLORIDE, SODIUM LACTATE, POTASSIUM CHLORIDE, CALCIUM CHLORIDE 600; 310; 30; 20 MG/100ML; MG/100ML; MG/100ML; MG/100ML
20 INJECTION, SOLUTION INTRAVENOUS CONTINUOUS
Status: DISCONTINUED | OUTPATIENT
Start: 2024-03-25 | End: 2024-03-26 | Stop reason: HOSPADM

## 2024-03-25 RX ORDER — PROPOFOL 10 MG/ML
INJECTION, EMULSION INTRAVENOUS CONTINUOUS PRN
Status: DISCONTINUED | OUTPATIENT
Start: 2024-03-25 | End: 2024-03-25

## 2024-03-25 RX ADMIN — MIDAZOLAM HYDROCHLORIDE 2 MG: 1 INJECTION INTRAMUSCULAR; INTRAVENOUS at 09:30

## 2024-03-25 RX ADMIN — PROPOFOL 40 MG: 10 INJECTION, EMULSION INTRAVENOUS at 09:35

## 2024-03-25 RX ADMIN — GLYCOPYRROLATE 0.1 MG: 0.2 INJECTION, SOLUTION INTRAMUSCULAR; INTRAVITREAL at 09:28

## 2024-03-25 RX ADMIN — SODIUM CHLORIDE, POTASSIUM CHLORIDE, SODIUM LACTATE AND CALCIUM CHLORIDE: 600; 310; 30; 20 INJECTION, SOLUTION INTRAVENOUS at 09:26

## 2024-03-25 RX ADMIN — LIDOCAINE HYDROCHLORIDE 1 APPLICATION: 20 JELLY TOPICAL at 08:30

## 2024-03-25 RX ADMIN — PROPOFOL 40 MG: 10 INJECTION, EMULSION INTRAVENOUS at 09:31

## 2024-03-25 RX ADMIN — PROPOFOL 40 MG: 10 INJECTION, EMULSION INTRAVENOUS at 09:37

## 2024-03-25 RX ADMIN — SODIUM CHLORIDE, POTASSIUM CHLORIDE, SODIUM LACTATE AND CALCIUM CHLORIDE 20 ML/HR: 600; 310; 30; 20 INJECTION, SOLUTION INTRAVENOUS at 07:33

## 2024-03-25 RX ADMIN — PROPOFOL 200 MCG/KG/MIN: 10 INJECTION, EMULSION INTRAVENOUS at 09:31

## 2024-03-25 RX ADMIN — LIDOCAINE HYDROCHLORIDE 100 MG: 20 INJECTION, SOLUTION INFILTRATION; PERINEURAL at 09:30

## 2024-03-25 ASSESSMENT — ENCOUNTER SYMPTOMS: CONSTITUTIONAL NEGATIVE: 1

## 2024-03-25 ASSESSMENT — PAIN SCALES - GENERAL
PAINLEVEL_OUTOF10: 0 - NO PAIN

## 2024-03-25 ASSESSMENT — PAIN - FUNCTIONAL ASSESSMENT
PAIN_FUNCTIONAL_ASSESSMENT: 0-10
PAIN_FUNCTIONAL_ASSESSMENT: 0-10
PAIN_FUNCTIONAL_ASSESSMENT: VAS (VISUAL ANALOG SCALE)
PAIN_FUNCTIONAL_ASSESSMENT: 0-10

## 2024-03-25 NOTE — ANESTHESIA PREPROCEDURE EVALUATION
Patient: Marleni Davey    Procedure Information       Date/Time: 03/25/24 1000    Scheduled providers: Fortino Cox DO; Carlos Eduardo Garibay MD; ANA Salazar    Procedures:       BRAVO      EGD    Location: [facility]            Relevant Problems   Cardiovascular   (+) Angina pectoris (CMS/HCC)   (+) Benign essential hypertension   (+) Cardiac rhythm disorder or disturbance or change   (+) Chest pain   (+) Hypertensive disorder      GI   (+) GERD (gastroesophageal reflux disease)      Neuro/Psych   (+) Anxiety       Clinical information reviewed:   Tobacco  Allergies  Meds   Med Hx  Surg Hx  OB Status  Fam Hx  Soc   Hx         Past Medical History:   Diagnosis Date    GERD (gastroesophageal reflux disease)     HTN (hypertension)     Migraines     [place] (obstructive sleep apnea)       Past Surgical History:   Procedure Laterality Date    BREAST SURGERY Left     to unblock duct    BUNIONECTOMY      CHOLECYSTECTOMY  06/15/2020    COLONOSCOPY      ESOPHAGOGASTRODUODENOSCOPY      KNEE ARTHROSCOPY W/ DEBRIDEMENT Right 2017    OVARIAN CYST REMOVAL      TUBAL LIGATION       Social History     Tobacco Use    Smoking status: Former     Types: Cigarettes    Smokeless tobacco: Never   Substance Use Topics    Alcohol use: Yes     Comment: occasional    Drug use: Never      Current Outpatient Medications   Medication Instructions    ACETAMINOPHEN ORAL 2 tablets, oral, Daily PRN    butalbitaL-acetaminop-caf-cod (Fioricet W/Codeine) -09-30 mg capsule 1 capsule, oral, Every 4 hours PRN    butalbital-acetaminophen-caff (Fioricet) -40 mg capsule oral    cyclobenzaprine (Flexeril) 10 mg tablet oral    cyclobenzaprine (Flexeril) 5 mg tablet 1 tablet, oral, As needed    famotidine (PEPCID) 40 mg, oral, Daily    ibuprofen 200 mg tablet 2 tablets, oral, Every 6 hours PRN    metoprolol succinate XL (Toprol-XL) 50 mg 24 hr tablet 1 tablet, oral, Daily    nortriptyline (PAMELOR) 10 mg, oral, Nightly     "ondansetron ODT (Zofran-ODT) 4 mg disintegrating tablet 1 tablet, oral, Every 6 hours    pantoprazole (PROTONIX) 40 mg, oral, 2 times daily      Allergies   Allergen Reactions    Eletriptan Shortness of breath    Topiramate Other     Change in mental status      Zolmitriptan GI Upset    Trazodone Other     Tingling fingers      Sumatriptan Unknown    Triamcinolone Rash        Chemistry    Lab Results   Component Value Date/Time     06/21/2023 0541    K 3.9 06/21/2023 0541     06/21/2023 0541    CO2 28 06/21/2023 0541    BUN 13 06/21/2023 0541    CREATININE 0.89 06/21/2023 0541    Lab Results   Component Value Date/Time    CALCIUM 8.8 06/21/2023 0541    ALKPHOS 61 06/20/2023 1718    AST 16 06/20/2023 1718    ALT 18 06/20/2023 1718    BILITOT 0.5 06/20/2023 1718          Lab Results   Component Value Date    HGBA1C 5.6 02/05/2024     Lab Results   Component Value Date/Time    WBC 4.7 06/21/2023 0541    HGB 12.8 06/21/2023 0541    HCT 38.7 06/21/2023 0541     06/21/2023 0541     Lab Results   Component Value Date/Time    PROTIME 12.0 06/20/2023 1718    INR 1.1 06/20/2023 1718     No results found for: \"ABORH\"  No results found for this or any previous visit (from the past 4464 hour(s)).  No results found for this or any previous visit from the past 1095 days.  Echo 3/10/2015:   LV:       Left ventricle chamber size is normal. Left ventricle            function  is normal. No abnormalities visualized in left            ventricle.  No left ventricular hypertrophy present. LV            function  is normal. Overall wall motion is normal.            Estimated  EF is 55-59%.  RV:       Right ventricle size is normal. Right ventricle function is            normal.  No right ventricle hypertrophy present. No            abnormalities  visualized in the right ventricle. RV            function  is normal. RV wall motion is normal.  LA:       Left atrial size is normal. No abnormalities visualized in            " "the  left atrium.  RA:       Right atrium size is normal. No abnormalities visualized in            the  right atrium.  TYLER:     Pericardium is normal.  AO:       Aorta is normal.  AV:       Aortic valve is structurally normal. No evidence of            regurgitation.  MV:       Trace to mild mitral regurgitation.  PV:       Pulmonic valve is not well visualized.  TV:       Mild tricuspid regurgitation.       IMPRESSION:       Left ventricle chamber size is normal.   Left ventricle function is normal.   Estimated EF is 55-59%.   Right ventricle size is normal.   Pericardium is normal.   Aorta is normal.   Aortic valve is structurally normal. No evidence of regurgitation.   Trace to mild mitral regurgitation.   Mild tricuspid regurgitation.TR peak velocity is 228 cm/s  RVSP is 31  mmHg    Visit Vitals  /66   Pulse 64   Temp 36.5 °C (97.7 °F) (Temporal)   Resp 16   Ht 1.499 m (4' 11\")   Wt 79.1 kg (174 lb 6.1 oz)   SpO2 96%   BMI 35.22 kg/m²   OB Status Postmenopausal   Smoking Status Former   BSA 1.81 m²     NPO/Void Status  Carbohydrate Drink Given Prior to Surgery? : N  Date of Last Liquid: 03/24/24  Time of Last Liquid: 2200  Date of Last Solid: 03/24/24  Time of Last Solid: 1500  Last Intake Type: Clear fluids  Time of Last Void: 0600         Physical Exam    Airway  Mallampati: III  TM distance: >3 FB  Neck ROM: full     Cardiovascular   Rhythm: regular  Rate: normal     Dental - normal exam     Pulmonary   Breath sounds clear to auscultation     Abdominal - normal exam              Anesthesia Plan    History of general anesthesia?: yes  History of complications of general anesthesia?: no    ASA 2     MAC     intravenous induction   Anesthetic plan and risks discussed with patient.    Plan discussed with CRNA and CAA.        "

## 2024-03-25 NOTE — H&P
History Of Present Illness  Malreni Davey is a 59 y.o. female presenting with GERD symptoms referred for EMOT, EGD and Bravo study.     Past Medical History  Past Medical History:   Diagnosis Date    GERD (gastroesophageal reflux disease)     HTN (hypertension)     Migraines     [place] (obstructive sleep apnea)     PONV (postoperative nausea and vomiting)     with GA     Surgical History  Past Surgical History:   Procedure Laterality Date    BREAST SURGERY Left     to unblock duct    BUNIONECTOMY      CHOLECYSTECTOMY  06/15/2020    COLONOSCOPY      ESOPHAGOGASTRODUODENOSCOPY      KNEE ARTHROSCOPY W/ DEBRIDEMENT Right 2017    OVARIAN CYST REMOVAL      TUBAL LIGATION       Social History  She reports that she has quit smoking. Her smoking use included cigarettes. She has never used smokeless tobacco. She reports current alcohol use of about 1.0 standard drink of alcohol per week. She reports that she does not use drugs.    Family History  Family History   Problem Relation Name Age of Onset    Skin cancer Mother Tonie         Allergies  Allergies   Allergen Reactions    Eletriptan Shortness of breath    Topiramate Other     Change in mental status      Zolmitriptan GI Upset    Trazodone Other     Tingling fingers      Sumatriptan Unknown    Triamcinolone Rash     Review of Systems   Constitutional: Negative.         Physical Exam  Constitutional:       Appearance: Normal appearance.   Cardiovascular:      Rate and Rhythm: Normal rate and regular rhythm.   Pulmonary:      Effort: Pulmonary effort is normal.      Breath sounds: Normal breath sounds.   Abdominal:      Palpations: Abdomen is soft.   Neurological:      Mental Status: She is alert.   Psychiatric:         Mood and Affect: Mood normal.         Behavior: Behavior normal.         Judgment: Judgment normal.          Last Recorded Vitals  Blood pressure 104/66, pulse 64, temperature 36.5 °C (97.7 °F), temperature source Temporal, resp. rate 16, height 1.499 m (4'  "11\"), weight 79.1 kg (174 lb 6.1 oz), SpO2 96 %.    Assessment/Plan   EGD as planned for Bravo pH study     Fortino Cox, DO  "

## 2024-03-25 NOTE — ANESTHESIA POSTPROCEDURE EVALUATION
Patient: Marleni Davey    Procedure Summary       Date: 03/25/24 Room / Location: [facility] [place][note 3]    Anesthesia Start: 0926 Anesthesia Stop: 0944    Procedures:       BRAVO      EGD Diagnosis:       Hiatal hernia      Hiatal hernia with gastroesophageal reflux    Scheduled Providers: Fortino Cox DO; Carlos Eduardo Garibay MD; ANA Salazar; Amber Lombardo, RN Responsible Provider: Carlos Eduardo Garibay MD    Anesthesia Type: MAC ASA Status: 2            Anesthesia Type: MAC        Anesthesia Post Evaluation    Patient location during evaluation: PACU  Patient participation: complete - patient participated  Level of consciousness: awake and alert  Pain management: adequate  Airway patency: patent  Cardiovascular status: acceptable and hemodynamically stable  Respiratory status: acceptable, spontaneous ventilation and nonlabored ventilation  Hydration status: acceptable  Postoperative Nausea and Vomiting: none        There were no known notable events for this encounter.

## 2024-03-25 NOTE — DISCHARGE INSTRUCTIONS

## 2024-03-27 PROCEDURE — 91035 G-ESOPH REFLX TST W/ELECTROD: CPT | Performed by: INTERNAL MEDICINE

## 2024-03-29 ENCOUNTER — TELEPHONE (OUTPATIENT)
Dept: GASTROENTEROLOGY | Facility: HOSPITAL | Age: 59
End: 2024-03-29
Payer: COMMERCIAL

## 2024-03-29 NOTE — TELEPHONE ENCOUNTER
----- Message from Crystal Navarro RN sent at 3/29/2024 12:25 PM EDT -----  Regarding: Pain after EGD BRAVO 3/25/24  Hi Dr. Cox,    She is having pain when she is eating/swallowing, feels like food is stuck.  Pain is 7-8/10 per patient.  She would like to speak with you regarding symptoms.    Thank you,    Crystal

## 2024-04-12 ENCOUNTER — OFFICE VISIT (OUTPATIENT)
Dept: SURGERY | Facility: CLINIC | Age: 59
End: 2024-04-12
Payer: COMMERCIAL

## 2024-04-12 VITALS
HEIGHT: 59 IN | WEIGHT: 172 LBS | BODY MASS INDEX: 34.68 KG/M2 | HEART RATE: 63 BPM | OXYGEN SATURATION: 96 % | SYSTOLIC BLOOD PRESSURE: 115 MMHG | DIASTOLIC BLOOD PRESSURE: 70 MMHG

## 2024-04-12 DIAGNOSIS — K44.9 HIATAL HERNIA WITH GASTROESOPHAGEAL REFLUX DISEASE WITHOUT ESOPHAGITIS: Primary | ICD-10-CM

## 2024-04-12 DIAGNOSIS — K21.9 HIATAL HERNIA WITH GASTROESOPHAGEAL REFLUX DISEASE WITHOUT ESOPHAGITIS: Primary | ICD-10-CM

## 2024-04-12 DIAGNOSIS — Z01.818 PRE-OPERATIVE EXAM: ICD-10-CM

## 2024-04-12 PROCEDURE — 3074F SYST BP LT 130 MM HG: CPT | Performed by: SURGERY

## 2024-04-12 PROCEDURE — 99214 OFFICE O/P EST MOD 30 MIN: CPT | Performed by: SURGERY

## 2024-04-12 PROCEDURE — 3078F DIAST BP <80 MM HG: CPT | Performed by: SURGERY

## 2024-04-12 NOTE — PROGRESS NOTES
"General Surgery Follow-Up Note    Referring Provider:   Patricia Taylor MD      Chief Complaint:  Refractory hiatal hernia    History of Present Illness:  This is a 59 y.o. female who presents for evaluation for refractory hiatal hernia.  She was last seen four months ago.  She reports that she had significant increase in heartburn when she held her proton pump inhibitors for the Bravo study.  She reports that she does have significant improvement in her symptoms with her proton pump inhibitors, but still has persistent symptoms.  She has lost weight since her last visit, but she reports she actually gained weight during the holidays and then lost even more than she gained.  She denies any persistent dysphagia while on proton pump inhibitors.      Vitals:   Vitals:    04/12/24 1151   BP: 115/70   Pulse: 63   SpO2: 96%   Weight: 78 kg (172 lb)   Height: 1.499 m (4' 11\")         Physical Exam:  General: No acute distress. Sitting up in bed.   Neuro: Alert and oriented ×3. Follows commands.  Head: Atraumatic  Eyes: Pupils equal reactive to light. Extraocular motions intact.  Ears: Hears normal speaking voice.  Mouth, Nose, Throat: Mucous membranes moist.  Normal dentition.  Neck: Supple. No appreciable masses.  Chest: No crepitus.  No appreciable scars.  Heart: Regular rate and rhythm.  Lung: Clear to auscultation bilaterally.  Vascular: No carotid bruits.  Palpable radial pulses bilaterally.  Abdomen: Soft. Nondistended. Nontender.    Musculoskeletal: Moves all extremities.  Normal range of motion.  Lymphatic: No palpable lymph nodes.  Skin: No rashes or lesions.  Psychological: Normal affect    Labs:  None      Imaging: I have personally reviewed the images and the radiologist's report.  THOMAS  DeMeester score of 29.1 consistent with her symptoms being due to reflux.    EGD  Hiatal hernia    EMOT: Report pending        Assessment:  This is a 59 y.o.-year-old female who presents for persistent hiatal hernia with " symptoms refractory to maximal medical therapy.  We discussed that I would recommend a laparoscopic hiatal hernia repair with toupee fundoplication.  We discussed that we would review the EMOT prior to surgery, but at this time it is unlikely to show anything to preclude surgery.  We discussed that she should continue to exercise and lose weight prior to surgery.  We discussed the risks of surgery including the extremely life-threatening risks, as well as the most likely risk being recurrence.      Plan:  -- We will plan for a laparoscopic hiatal hernia repair with toupee fundoplication  -- She will need preadmission testing    Kvng Jones MD  General Surgery  Office: [phone]  Fax: [phone]  12:37 PM  04/12/24        Past Medical History:  Past Medical History:   Diagnosis Date    GERD (gastroesophageal reflux disease)     HTN (hypertension)     Migraines     [place] (obstructive sleep apnea)     PONV (postoperative nausea and vomiting)     with GA        Past Surgical History:  Past Surgical History:   Procedure Laterality Date    BREAST SURGERY Left     to unblock duct    BUNIONECTOMY      CHOLECYSTECTOMY  06/15/2020    COLONOSCOPY      ESOPHAGOGASTRODUODENOSCOPY      KNEE ARTHROSCOPY W/ DEBRIDEMENT Right 2017    OVARIAN CYST REMOVAL      TUBAL LIGATION          Medications:  Current Outpatient Medications   Medication Instructions    ACETAMINOPHEN ORAL 2 tablets, oral, Daily PRN    butalbitaL-acetaminop-caf-cod (Fioricet W/Codeine) -80-30 mg capsule 1 capsule, oral, Every 4 hours PRN    butalbital-acetaminophen-caff (Fioricet) -40 mg capsule oral    cyclobenzaprine (Flexeril) 10 mg tablet oral    cyclobenzaprine (Flexeril) 5 mg tablet 1 tablet, oral, As needed    famotidine (PEPCID) 40 mg, oral, Daily    ibuprofen 200 mg tablet 2 tablets, oral, Every 6 hours PRN    metoprolol succinate XL (Toprol-XL) 50 mg 24 hr tablet 1 tablet, oral, Daily    nortriptyline (PAMELOR) 10 mg, oral, Nightly     ondansetron ODT (Zofran-ODT) 4 mg disintegrating tablet 1 tablet, oral, Every 6 hours    pantoprazole (PROTONIX) 40 mg, oral, 2 times daily        Allergies:  Allergies   Allergen Reactions    Eletriptan Shortness of breath    Topiramate Other     Change in mental status      Zolmitriptan GI Upset    Trazodone Other     Tingling fingers      Sumatriptan Unknown    Triamcinolone Rash        Family History:  Family History   Problem Relation Name Age of Onset    Skin cancer Mother Tonie         Social History:  Social History     Socioeconomic History    Marital status:      Spouse name: Not on file    Number of children: Not on file    Years of education: Not on file    Highest education level: Not on file   Occupational History    Not on file   Tobacco Use    Smoking status: Former     Types: Cigarettes    Smokeless tobacco: Never   Vaping Use    Vaping status: Never Used   Substance and Sexual Activity    Alcohol use: Yes     Alcohol/week: 1.0 standard drink of alcohol     Types: 1 Glasses of wine per week    Drug use: Never    Sexual activity: Not on file   Other Topics Concern    Not on file   Social History Narrative    Not on file     Social Determinants of Health     Financial Resource Strain: Low Risk  (2/7/2023)    Received from [facility]    Overall Financial Resource Strain (CARDIA)     Difficulty of Paying Living Expenses: Not hard at all   Food Insecurity: No Food Insecurity (2/7/2023)    Received from [facility]    Hunger Vital Sign     Worried About Running Out of Food in the Last Year: Never true     Ran Out of Food in the Last Year: Never true   Transportation Needs: No Transportation Needs (2/7/2023)    Received from [facility]    PRAPARE - Transportation     Lack of Transportation (Medical): No     Lack of Transportation (Non-Medical): No   Physical Activity: Inactive (2/7/2023)    Received from [facility]    Exercise Vital Sign     Days of Exercise per Week: 0 days      Minutes of Exercise per Session: 0 min   Stress: Stress Concern Present (2/7/2023)    Received from [facility]    [place] [address] [employer] - Occupational Stress Questionnaire     Feeling of Stress : To some extent   Social Connections: Socially Integrated (2/7/2023)    Received from [facility]    Social Connection and Isolation Panel [NHANES]     Frequency of Communication with Friends and Family: More than three times a week     Frequency of Social Gatherings with Friends and Family: Once a week     Attends Sabianist Services: More than 4 times per year     Active Member of Clubs or Organizations: Yes     Attends Club or Organization Meetings: More than 4 times per year     Marital Status:    Intimate Partner Violence: Not on file   Housing Stability: Low Risk  (2/7/2023)    Received from [facility]    Housing Stability Vital Sign     Unable to Pay for Housing in the Last Year: No     Number of Places Lived in the Last Year: 1     Unstable Housing in the Last Year: No        Review of Systems:  A complete 12 point review of systems was performed and is negative except as noted in the history of present illness.

## 2024-04-14 PROCEDURE — 91038 ESOPH IMPED FUNCT TEST > 1HR: CPT | Performed by: SURGERY

## 2024-04-18 DIAGNOSIS — K21.9 HIATAL HERNIA WITH GASTROESOPHAGEAL REFLUX DISEASE WITHOUT ESOPHAGITIS: Primary | ICD-10-CM

## 2024-04-18 DIAGNOSIS — K44.9 HIATAL HERNIA WITH GASTROESOPHAGEAL REFLUX DISEASE WITHOUT ESOPHAGITIS: Primary | ICD-10-CM

## 2024-04-30 ENCOUNTER — PRE-ADMISSION TESTING (OUTPATIENT)
Dept: PREADMISSION TESTING | Facility: HOSPITAL | Age: 59
End: 2024-04-30
Payer: COMMERCIAL

## 2024-04-30 VITALS
TEMPERATURE: 97.3 F | SYSTOLIC BLOOD PRESSURE: 94 MMHG | OXYGEN SATURATION: 100 % | WEIGHT: 176.37 LBS | HEIGHT: 59 IN | BODY MASS INDEX: 35.56 KG/M2 | RESPIRATION RATE: 16 BRPM | HEART RATE: 65 BPM | DIASTOLIC BLOOD PRESSURE: 70 MMHG

## 2024-04-30 DIAGNOSIS — K44.9 HIATAL HERNIA WITH GASTROESOPHAGEAL REFLUX DISEASE WITHOUT ESOPHAGITIS: ICD-10-CM

## 2024-04-30 DIAGNOSIS — K21.9 HIATAL HERNIA WITH GASTROESOPHAGEAL REFLUX DISEASE WITHOUT ESOPHAGITIS: ICD-10-CM

## 2024-04-30 LAB
ABO GROUP (TYPE) IN BLOOD: NORMAL
ALBUMIN SERPL BCP-MCNC: 4.4 G/DL (ref 3.4–5)
ALP SERPL-CCNC: 73 U/L (ref 33–136)
ALT SERPL W P-5'-P-CCNC: 22 U/L (ref 7–45)
ANION GAP SERPL CALC-SCNC: 11 MMOL/L (ref 10–20)
ANTIBODY SCREEN: NORMAL
AST SERPL W P-5'-P-CCNC: 16 U/L (ref 9–39)
BILIRUB DIRECT SERPL-MCNC: 0.1 MG/DL (ref 0–0.3)
BILIRUB SERPL-MCNC: 0.6 MG/DL (ref 0–1.2)
BUN SERPL-MCNC: 14 MG/DL (ref 6–23)
CALCIUM SERPL-MCNC: 9.2 MG/DL (ref 8.6–10.3)
CHLORIDE SERPL-SCNC: 104 MMOL/L (ref 98–107)
CO2 SERPL-SCNC: 28 MMOL/L (ref 21–32)
CREAT SERPL-MCNC: 0.88 MG/DL (ref 0.5–1.05)
EGFRCR SERPLBLD CKD-EPI 2021: 75 ML/MIN/1.73M*2
ERYTHROCYTE [DISTWIDTH] IN BLOOD BY AUTOMATED COUNT: 11.9 % (ref 11.5–14.5)
GLUCOSE SERPL-MCNC: 90 MG/DL (ref 74–99)
HCT VFR BLD AUTO: 41.1 % (ref 36–46)
HGB BLD-MCNC: 13.7 G/DL (ref 12–16)
INR PPP: 1.1 (ref 0.9–1.1)
MAGNESIUM SERPL-MCNC: 1.93 MG/DL (ref 1.6–2.4)
MCH RBC QN AUTO: 31.7 PG (ref 26–34)
MCHC RBC AUTO-ENTMCNC: 33.3 G/DL (ref 32–36)
MCV RBC AUTO: 95 FL (ref 80–100)
NRBC BLD-RTO: 0 /100 WBCS (ref 0–0)
PHOSPHATE SERPL-MCNC: 3.6 MG/DL (ref 2.5–4.9)
PLATELET # BLD AUTO: 257 X10*3/UL (ref 150–450)
POTASSIUM SERPL-SCNC: 3.9 MMOL/L (ref 3.5–5.3)
PROT SERPL-MCNC: 6.9 G/DL (ref 6.4–8.2)
PROTHROMBIN TIME: 12.3 SECONDS (ref 9.8–12.8)
RBC # BLD AUTO: 4.32 X10*6/UL (ref 4–5.2)
RH FACTOR (ANTIGEN D): NORMAL
SODIUM SERPL-SCNC: 139 MMOL/L (ref 136–145)
WBC # BLD AUTO: 5 X10*3/UL (ref 4.4–11.3)

## 2024-04-30 PROCEDURE — 99203 OFFICE O/P NEW LOW 30 MIN: CPT | Performed by: REGISTERED NURSE

## 2024-04-30 PROCEDURE — 85610 PROTHROMBIN TIME: CPT

## 2024-04-30 PROCEDURE — 84100 ASSAY OF PHOSPHORUS: CPT

## 2024-04-30 PROCEDURE — 82248 BILIRUBIN DIRECT: CPT

## 2024-04-30 PROCEDURE — 86901 BLOOD TYPING SEROLOGIC RH(D): CPT

## 2024-04-30 PROCEDURE — 36415 COLL VENOUS BLD VENIPUNCTURE: CPT

## 2024-04-30 PROCEDURE — 84075 ASSAY ALKALINE PHOSPHATASE: CPT

## 2024-04-30 PROCEDURE — 85027 COMPLETE CBC AUTOMATED: CPT

## 2024-04-30 PROCEDURE — 83735 ASSAY OF MAGNESIUM: CPT

## 2024-04-30 ASSESSMENT — DUKE ACTIVITY SCORE INDEX (DASI)
CAN YOU CLIMB A FLIGHT OF STAIRS OR WALK UP A HILL: YES
CAN YOU WALK A BLOCK OR TWO ON LEVEL GROUND: YES
CAN YOU DO MODERATE WORK AROUND THE HOUSE LIKE VACUUMING, SWEEPING FLOORS OR CARRYING GROCERIES: YES
CAN YOU HAVE SEXUAL RELATIONS: YES
CAN YOU TAKE CARE OF YOURSELF (EAT, DRESS, BATHE, OR USE TOILET): YES
DASI METS SCORE: 8.9
CAN YOU WALK INDOORS, SUCH AS AROUND YOUR HOUSE: YES
CAN YOU DO YARD WORK LIKE RAKING LEAVES, WEEDING OR PUSHING A MOWER: YES
CAN YOU DO HEAVY WORK AROUND THE HOUSE LIKE SCRUBBING FLOORS OR LIFTING AND MOVING HEAVY FURNITURE: NO
TOTAL_SCORE: 50.2
CAN YOU PARTICIPATE IN MODERATE RECREATIONAL ACTIVITIES LIKE GOLF, BOWLING, DANCING, DOUBLES TENNIS OR THROWING A BASEBALL OR FOOTBALL: YES
CAN YOU PARTICIPATE IN STRENOUS SPORTS LIKE SWIMMING, SINGLES TENNIS, FOOTBALL, BASKETBALL, OR SKIING: YES
CAN YOU RUN A SHORT DISTANCE: YES
CAN YOU DO LIGHT WORK AROUND THE HOUSE LIKE DUSTING OR WASHING DISHES: YES

## 2024-04-30 ASSESSMENT — ENCOUNTER SYMPTOMS
CARDIOVASCULAR NEGATIVE: 1
NECK STIFFNESS: 1
CONSTITUTIONAL NEGATIVE: 1
RESPIRATORY NEGATIVE: 1
NEUROLOGICAL NEGATIVE: 1

## 2024-04-30 ASSESSMENT — PAIN - FUNCTIONAL ASSESSMENT: PAIN_FUNCTIONAL_ASSESSMENT: 0-10

## 2024-04-30 ASSESSMENT — PAIN SCALES - GENERAL: PAINLEVEL_OUTOF10: 5 - MODERATE PAIN

## 2024-04-30 ASSESSMENT — LIFESTYLE VARIABLES: SMOKING_STATUS: NONSMOKER

## 2024-04-30 NOTE — PREPROCEDURE INSTRUCTIONS
Medication List            Accurate as of April 30, 2024 11:18 AM. Always use your most recent med list.                ACETAMINOPHEN ORAL  Medication Adjustments for Surgery: Take morning of surgery with sip of water, no other fluids     butalbitaL-acetaminop-caf-cod -12-30 mg capsule  Commonly known as: Fioricet W/Codeine  Notes to patient: If needed     * cyclobenzaprine 5 mg tablet  Commonly known as: Flexeril  Notes to patient: If needed     * cyclobenzaprine 10 mg tablet  Commonly known as: Flexeril     famotidine 40 mg tablet  Commonly known as: Pepcid  Take 1 tablet (40 mg) by mouth once daily.  Medication Adjustments for Surgery: Take morning of surgery with sip of water, no other fluids     Fioricet -40 mg capsule  Generic drug: butalbital-acetaminophen-caff     ibuprofen 200 mg tablet  Medication Adjustments for Surgery: Stop 7 days before surgery     metoprolol succinate XL 50 mg 24 hr tablet  Commonly known as: Toprol-XL  Medication Adjustments for Surgery: Take morning of surgery with sip of water, no other fluids     nortriptyline 10 mg capsule  Commonly known as: Pamelor  TAKE ONE CAPSULE BY MOUTH AT BEDTIME  Medication Adjustments for Surgery: Continue until night before surgery     ondansetron ODT 4 mg disintegrating tablet  Commonly known as: Zofran-ODT  Medication Adjustments for Surgery: Take morning of surgery with sip of water, no other fluids  Notes to patient: If needed     pantoprazole 40 mg EC tablet  Commonly known as: ProtoNix  Medication Adjustments for Surgery: Take morning of surgery with sip of water, no other fluids           * This list has 2 medication(s) that are the same as other medications prescribed for you. Read the directions carefully, and ask your doctor or other care provider to review them with you.                     SURGERY PRE-OPERATIVE INSTRUCTIONS    *You will receive a phone call the day before your procedure  after 2pm, (or the Friday before your  surgery if scheduled on a Monday.) Generally the hospital will be calling you with this information after that time.    *You are not to eat after midnight the night before the surgery. You may have 8oz of a clear liquid up until 2 hours prior to arriving to the hospital. The exception is with medications you were instructed to take day of surgery.    *You may take tylenol for pain/discomfort as needed.     *Stop taking all aspirin products, ibuprofen (motrin/advil), naproxen (aleve/naprosyn) for one week prior to surgery.    *Stop taking all vitamins and supplements one week prior to surgery.     *You should not have alcoholic beverages for 24 hours before surgery.     *You should not smoke 24 hours prior to surgery.     *To help prevent surgical infections bathe/shower with Dial soap the evening before surgery.    *You can wear deodorant but no lotion, powder, or perfume/cologne. You should remove all make-up and nail polish at home.    *If you wear glasses, please bring a case for the glasses with you.    *You will be asked to remove dentures and contacts.     *Please leave all valuables at home.    *You should wear loose, comfortable clothing that will accommodate bandages and/or casts.    *You should notify your doctor of any change in your condition (fever, cold, rash, etc). Surgery may need to be re-scheduled until a time you are in better health.    *A responsible adult is required to accompany you to and from the hospital if you are receiving anesthesia or a sedative. Patients are not permitted to drive for 24 hours after anesthesia.     *You can use the [facility] parking if you wish.     *If you have any further questions please call [facility] [phone].              NPO Instructions:        Additional Instructions:

## 2024-04-30 NOTE — CPM/PAT H&P
CPM/PAT Evaluation       Name: Marleni Davey (Marleni A Davey)  /Age: 1965/59 y.o.     In-Person       Chief Complaint: Evaluation prior to surgery    HPI  59 year old female scheduled for Repair Diaphragmatic Hernia Laparoscopy, FUNDOPLICATION LAPAROSCOPY on 24 with Dr. Jones secondary to Hiatal hernia with gastroesophageal reflux disease without esophagitis. PMHx includes HTN, HLD, functional dyspepsia, GERD, [place], migraines, cervical disc disease, PONV, depression. Presents to [facility] for preoperative risk stratification and optimization.   Past Medical History:   Diagnosis Date    Anxiety     Arrhythmia     svt history    Cervical disc disease     GERD (gastroesophageal reflux disease)     Hyperlipidemia     Migraines     AL[place]RIA (obstructive sleep apnea)     has deviated septum,was unable to use cpap    PONV (postoperative nausea and vomiting)     with GA       Past Surgical History:   Procedure Laterality Date    BREAST SURGERY Left     to unblock duct    BUNIONECTOMY      pin placed    CARDIAC CATHETERIZATION      approx 20yrs ago    CHOLECYSTECTOMY  06/15/2020    COLONOSCOPY      ESOPHAGOGASTRODUODENOSCOPY      KNEE ARTHROSCOPY W/ DEBRIDEMENT Right 2017    OVARIAN CYST REMOVAL      TUBAL LIGATION      UPPER GASTROINTESTINAL ENDOSCOPY      3/25/24       Patient  has no history on file for sexual activity.    Family History   Problem Relation Name Age of Onset    Skin cancer Mother Tonie        Allergies   Allergen Reactions    Eletriptan Shortness of breath    Topiramate Other     Change in mental status      Zolmitriptan GI Upset    Trazodone Other     Tingling fingers      Sumatriptan Unknown    Triamcinolone Rash       Prior to Admission medications    Medication Sig Start Date End Date Taking? Authorizing Provider   ACETAMINOPHEN ORAL Take 2 tablets by mouth once daily as needed.    Historical Provider, MD   butalbitaL-acetaminop-caf-cod (Fioricet W/Codeine) -96-30 mg capsule Take 1 capsule  by mouth every 4 hours if needed.    Historical Provider, MD   butalbital-acetaminophen-caff (Fioricet) -40 mg capsule Take by mouth. 12/3/20   Historical Provider, MD   cyclobenzaprine (Flexeril) 10 mg tablet Take by mouth. 12/3/20   Historical Provider, MD   cyclobenzaprine (Flexeril) 5 mg tablet Take 1 tablet (5 mg) by mouth if needed.    Historical Provider, MD   famotidine (Pepcid) 40 mg tablet Take 1 tablet (40 mg) by mouth once daily. 2/27/24 4/27/24  Salina Jc PA-C   ibuprofen 200 mg tablet Take 2 tablets (400 mg) by mouth every 6 hours if needed.    Historical Provider, MD   metoprolol succinate XL (Toprol-XL) 50 mg 24 hr tablet Take 1 tablet (50 mg) by mouth once daily. 2/13/18   Historical Provider, MD   nortriptyline (Pamelor) 10 mg capsule TAKE ONE CAPSULE BY MOUTH AT BEDTIME 3/11/24   PIEDAD Louise-CNP   ondansetron ODT (Zofran-ODT) 4 mg disintegrating tablet Take 1 tablet (4 mg) by mouth every 6 hours. 3/27/23   Historical Provider, MD   pantoprazole (ProtoNix) 40 mg EC tablet Take 1 tablet (40 mg) by mouth 2 times a day.    Historical Provider, MD CURRAN ROS:   Constitutional:   neg    Neuro/Psych:   neg    Eyes:    use of corrective lenses  Ears:   neg    Nose:   Mouth:   neg    Throat:   neg    Neck:    neck stiffness  Cardio:   neg    Respiratory:   neg    Endocrine:   GI:    Abdominal discomfort pain 5/10  :   neg    Musculoskeletal:   Hematologic:   neg    Skin:  neg        Physical Exam  Vitals reviewed.   Constitutional:       Appearance: Normal appearance.   HENT:      Head: Normocephalic and atraumatic.      Nose: Nose normal.      Mouth/Throat:      Mouth: Mucous membranes are moist.      Pharynx: Oropharynx is clear.   Eyes:      Pupils: Pupils are equal, round, and reactive to light.   Neck:      Vascular: No carotid bruit.   Cardiovascular:      Rate and Rhythm: Normal rate and regular rhythm.      Pulses: Normal pulses.      Heart sounds: Normal heart sounds.    Pulmonary:      Effort: Pulmonary effort is normal.      Breath sounds: Normal breath sounds.   Abdominal:      Palpations: Abdomen is soft.      Hernia: A hernia is present.   Musculoskeletal:         General: Normal range of motion.      Cervical back: Neck supple.   Skin:     General: Skin is warm and dry.      Capillary Refill: Capillary refill takes less than 2 seconds.   Neurological:      General: No focal deficit present.      Mental Status: She is alert and oriented to person, place, and time. Mental status is at baseline.   Psychiatric:         Mood and Affect: Mood normal.         Behavior: Behavior normal.         Thought Content: Thought content normal.         Judgment: Judgment normal.          PAT AIRWAY:   Airway:     Mallampati::  II    TM distance::  >3 FB    Neck ROM::  Limited  normal        Visit Vitals  BP 94/70   Pulse 65   Temp 36.3 °C (97.3 °F) (Tympanic)   Resp 16       DASI Risk Score      Flowsheet Row Most Recent Value   DASI SCORE 50.2   METS Score (Will be calculated only when all the questions are answered) 8.9          Caprini DVT Assessment      Flowsheet Row Most Recent Value   DVT Score 7   Current Status Major surgery planned, including arthroscopic and laproscopic (1-2 hours)   Age 60-75 years   BMI 31-40 (Obesity)          Modified Frailty Index    No data to display       CHADS2 Stroke Risk  Current as of 8 minutes ago        N/A 3 to 100%: High Risk   2 to < 3%: Medium Risk   0 to < 2%: Low Risk     Last Change: N/A          This score determines the patient's risk of having a stroke if the patient has atrial fibrillation.        This score is not applicable to this patient. Components are not calculated.          Revised Cardiac Risk Index      Flowsheet Row Most Recent Value   Revised Cardiac Risk Calculator 1          Apfel Simplified Score    No data to display       Risk Analysis Index Results This Encounter    No data found in the last 1 encounters.       Stop Bang  Score      Flowsheet Row Most Recent Value   Do you snore loudly? 1   Do you often feel tired or fatigued after your sleep? 0   Has anyone ever observed you stop breathing in your sleep? 0   Do you have or are you being treated for high blood pressure? 0   Recent BMI (Calculated) 34.7   Is BMI greater than 35 kg/m2? 0=No   Age older than 50 years old? 1=Yes   Is your neck circumference greater than 17 inches (Male) or 16 inches (Female)? 0   Gender - Male 0=No   STOP-BANG Total Score 2            Assessment and Plan:     Anesthesia:  The patient notes anesthesia complications in the past related to PONV.    Neuro:   The patient has diagnoses or significant findings on chart review or clinical presentation and evaluation significant for depression on pamelor. The patient is at increased risk for perioperative stroke secondary to increased age, hyperlipidemia, female gender. Handouts for preoperative brain exercises given to patient.    HEENT/Airway  The patient has diagnoses, significant findings on chart review, clinical presentation or evaluation of [place], non compliant with CPAP, does not use. History of cervical disk disease.     Cardiovascular    RCRI  The patient meets 0-1 RCRI criteria and therefore has a less than 1% risk of major adverse cardiac complications.  METS  The patient's functional capacity capacity is greater than 4 METS.  EKG  6/20/23  SR with short MO  VR 60  Heart Failure  The patient has no known history of heart failure.  Additionally, the patient reports no symptoms of heart failure and demonstrates no signs of heart failure.  Hypertension Evaluation  The patient has a known history of hypertension that is controlled on metoprolol    Cardiology Evaluation  The patient is not followed by cardiology.    MILI score which indicates a 0.3% risk of intraoperative or 30-day postoperative.    Pulmonary   The patient has findings on chart review, clinical presentation and evaluation significant for  [place].    The patient has a stop bang score of 2, which places patient at low risk for having [place].    ARISCAT 18, low, 1.6% risk of in-hospital postoperative pulmonary complications  PRODIGY 13, intermediate risk of respiratory depression episode. Patient given PI sheet for preoperative deep breathing exercises.    Hematology    Antiplatelet management   The patient is not currently receiving antiplatelet therapy.  Anticoagulation management  The patient is not currently receiving anticoagulation therapy. Patient provided with DVT educational handout.      Caprini score 7, high risk of perioperative VTE.     Patient instructed to ambulate as soon as possible postoperatively to decrease thromboembolic risk. Initiate mechanical DVT prophylaxis as soon as possible and initiate chemical prophylaxis when deemed safe from a bleeding standpoint post surgery.     Transfusion Evaluation  A type and screen was obtained given the likelihood for perioperative transfusion of blood or blood products.    Gastrointestinal  The patient has diagnoses or significant findings on chart review or clinical presentation and evaluation significant for Hiatal hernia with gastroesophageal reflux disease without esophagitis, functional dyspepsia on pepcid and protonix.  Eat 10- 0,  self-perceived oropharyngeal dysphagia scale (0-40)   12/19/23 CT abdomen pelvis  FINDINGS:  LOWER CHEST:  Images through the lung bases  demonstrate mild areas of atelectasis  and/or scarring.      ABDOMEN AND PELVIS:      LIVER:  Within normal limits.      BILE DUCTS:  Not abnormally dilated.      GALLBLADDER:  The gallbladder is surgically absent.      PANCREAS:  Appears unremarkable.      SPLEEN:  Appears unremarkable.      ADRENAL GLANDS:  Appear unremarkable.      KIDNEYS, URETERS, AND BLADDER:  The kidneys enhance with contrast material symmetrically. There is no  evidence of hydronephrosis.  The urinary bladder appears grossly  unremarkable.       BOWEL:  Colonic diverticulosis. No evidence of diverticulitis. Small bowel  loops appear normal in thickness and caliber. There is no evidence of  a bowel obstruction.  Appendix is normal in caliber.  Although CT has  limited sensitivity and specificity for gastric pathology, the  stomach appears grossly unremarkable.      RETROPERITONEUM, VESSELS:  There is no aneurysmal dilatation of the abdominal aorta. The IVC is  within normal limits.  There are atherosclerotic calcifications of  the aorta and its branches. No pathologically enlarged  retroperitoneal lymph nodes are noted.      PERITONEUM:  There is no evidence of pneumoperitoneum.  No ascites or loculated  fluid collection noted. Uterus is unchanged in size. Bilateral tubal  ligation clips are noted. No pathologically enlarged mesenteric lymph  nodes are identified.      ABDOMINAL WALL, SOFT TISSUES:  No acute process. Small fat containing umbilical hernia and small fat  containing supraumbilical hernia at the midline. Small fat containing  inguinal hernias as well.      SKELETON:  Degenerative changes. Grade 1 spondylolisthesis of L4 on L5. No acute  process.      IMPRESSION:  No CT evidence of an acute intra-abdominal or pelvic process.  Incidental findings as above.    Genitourinary  No diagnoses or significant findings on chart review or clinical presentation and evaluation.    Renal  The patient has no known history of chronic kidney disease.    Musculoskeletal  The patient has diagnoses or significant findings on chart review or clinical presentation and evaluation significant for cervical disc disease.    Endocrine  Diabetes Evaluation  The patient has no history of diabetes mellitus.  Thyroid Disease Evaluation  The patient has no history of thyroid disease.      -Preoperative medication instructions were provided and reviewed with the patient.  Any additional testing or evaluation was explained to the patient.  NPO Instructions were discussed, and  the patient's questions were answered prior to conclusion of this encounter.

## 2024-04-30 NOTE — H&P (VIEW-ONLY)
CPM/PAT Evaluation       Name: Marleni Davey (Marleni A Davey)  /Age: 1965/59 y.o.     In-Person       Chief Complaint: Evaluation prior to surgery    HPI  59 year old female scheduled for Repair Diaphragmatic Hernia Laparoscopy, FUNDOPLICATION LAPAROSCOPY on 24 with Dr. Jones secondary to Hiatal hernia with gastroesophageal reflux disease without esophagitis. PMHx includes HTN, HLD, functional dyspepsia, GERD, [place], migraines, cervical disc disease, PONV, depression. Presents to [facility] for preoperative risk stratification and optimization.   Past Medical History:   Diagnosis Date    Anxiety     Arrhythmia     svt history    Cervical disc disease     GERD (gastroesophageal reflux disease)     Hyperlipidemia     Migraines     AL[place]RIA (obstructive sleep apnea)     has deviated septum,was unable to use cpap    PONV (postoperative nausea and vomiting)     with GA       Past Surgical History:   Procedure Laterality Date    BREAST SURGERY Left     to unblock duct    BUNIONECTOMY      pin placed    CARDIAC CATHETERIZATION      approx 20yrs ago    CHOLECYSTECTOMY  06/15/2020    COLONOSCOPY      ESOPHAGOGASTRODUODENOSCOPY      KNEE ARTHROSCOPY W/ DEBRIDEMENT Right 2017    OVARIAN CYST REMOVAL      TUBAL LIGATION      UPPER GASTROINTESTINAL ENDOSCOPY      3/25/24       Patient  has no history on file for sexual activity.    Family History   Problem Relation Name Age of Onset    Skin cancer Mother Tonie        Allergies   Allergen Reactions    Eletriptan Shortness of breath    Topiramate Other     Change in mental status      Zolmitriptan GI Upset    Trazodone Other     Tingling fingers      Sumatriptan Unknown    Triamcinolone Rash       Prior to Admission medications    Medication Sig Start Date End Date Taking? Authorizing Provider   ACETAMINOPHEN ORAL Take 2 tablets by mouth once daily as needed.    Historical Provider, MD   butalbitaL-acetaminop-caf-cod (Fioricet W/Codeine) -00-30 mg capsule Take 1 capsule  by mouth every 4 hours if needed.    Historical Provider, MD   butalbital-acetaminophen-caff (Fioricet) -40 mg capsule Take by mouth. 12/3/20   Historical Provider, MD   cyclobenzaprine (Flexeril) 10 mg tablet Take by mouth. 12/3/20   Historical Provider, MD   cyclobenzaprine (Flexeril) 5 mg tablet Take 1 tablet (5 mg) by mouth if needed.    Historical Provider, MD   famotidine (Pepcid) 40 mg tablet Take 1 tablet (40 mg) by mouth once daily. 2/27/24 4/27/24  Salina Jc PA-C   ibuprofen 200 mg tablet Take 2 tablets (400 mg) by mouth every 6 hours if needed.    Historical Provider, MD   metoprolol succinate XL (Toprol-XL) 50 mg 24 hr tablet Take 1 tablet (50 mg) by mouth once daily. 2/13/18   Historical Provider, MD   nortriptyline (Pamelor) 10 mg capsule TAKE ONE CAPSULE BY MOUTH AT BEDTIME 3/11/24   PIEDAD Louise-CNP   ondansetron ODT (Zofran-ODT) 4 mg disintegrating tablet Take 1 tablet (4 mg) by mouth every 6 hours. 3/27/23   Historical Provider, MD   pantoprazole (ProtoNix) 40 mg EC tablet Take 1 tablet (40 mg) by mouth 2 times a day.    Historical Provider, MD CURRAN ROS:   Constitutional:   neg    Neuro/Psych:   neg    Eyes:    use of corrective lenses  Ears:   neg    Nose:   Mouth:   neg    Throat:   neg    Neck:    neck stiffness  Cardio:   neg    Respiratory:   neg    Endocrine:   GI:    Abdominal discomfort pain 5/10  :   neg    Musculoskeletal:   Hematologic:   neg    Skin:  neg        Physical Exam  Vitals reviewed.   Constitutional:       Appearance: Normal appearance.   HENT:      Head: Normocephalic and atraumatic.      Nose: Nose normal.      Mouth/Throat:      Mouth: Mucous membranes are moist.      Pharynx: Oropharynx is clear.   Eyes:      Pupils: Pupils are equal, round, and reactive to light.   Neck:      Vascular: No carotid bruit.   Cardiovascular:      Rate and Rhythm: Normal rate and regular rhythm.      Pulses: Normal pulses.      Heart sounds: Normal heart sounds.    Pulmonary:      Effort: Pulmonary effort is normal.      Breath sounds: Normal breath sounds.   Abdominal:      Palpations: Abdomen is soft.      Hernia: A hernia is present.   Musculoskeletal:         General: Normal range of motion.      Cervical back: Neck supple.   Skin:     General: Skin is warm and dry.      Capillary Refill: Capillary refill takes less than 2 seconds.   Neurological:      General: No focal deficit present.      Mental Status: She is alert and oriented to person, place, and time. Mental status is at baseline.   Psychiatric:         Mood and Affect: Mood normal.         Behavior: Behavior normal.         Thought Content: Thought content normal.         Judgment: Judgment normal.          PAT AIRWAY:   Airway:     Mallampati::  II    TM distance::  >3 FB    Neck ROM::  Limited  normal        Visit Vitals  BP 94/70   Pulse 65   Temp 36.3 °C (97.3 °F) (Tympanic)   Resp 16       DASI Risk Score      Flowsheet Row Most Recent Value   DASI SCORE 50.2   METS Score (Will be calculated only when all the questions are answered) 8.9          Caprini DVT Assessment      Flowsheet Row Most Recent Value   DVT Score 7   Current Status Major surgery planned, including arthroscopic and laproscopic (1-2 hours)   Age 60-75 years   BMI 31-40 (Obesity)          Modified Frailty Index    No data to display       CHADS2 Stroke Risk  Current as of 8 minutes ago        N/A 3 to 100%: High Risk   2 to < 3%: Medium Risk   0 to < 2%: Low Risk     Last Change: N/A          This score determines the patient's risk of having a stroke if the patient has atrial fibrillation.        This score is not applicable to this patient. Components are not calculated.          Revised Cardiac Risk Index      Flowsheet Row Most Recent Value   Revised Cardiac Risk Calculator 1          Apfel Simplified Score    No data to display       Risk Analysis Index Results This Encounter    No data found in the last 1 encounters.       Stop Bang  Score      Flowsheet Row Most Recent Value   Do you snore loudly? 1   Do you often feel tired or fatigued after your sleep? 0   Has anyone ever observed you stop breathing in your sleep? 0   Do you have or are you being treated for high blood pressure? 0   Recent BMI (Calculated) 34.7   Is BMI greater than 35 kg/m2? 0=No   Age older than 50 years old? 1=Yes   Is your neck circumference greater than 17 inches (Male) or 16 inches (Female)? 0   Gender - Male 0=No   STOP-BANG Total Score 2            Assessment and Plan:     Anesthesia:  The patient notes anesthesia complications in the past related to PONV.    Neuro:   The patient has diagnoses or significant findings on chart review or clinical presentation and evaluation significant for depression on pamelor. The patient is at increased risk for perioperative stroke secondary to increased age, hyperlipidemia, female gender. Handouts for preoperative brain exercises given to patient.    HEENT/Airway  The patient has diagnoses, significant findings on chart review, clinical presentation or evaluation of [place], non compliant with CPAP, does not use. History of cervical disk disease.     Cardiovascular    RCRI  The patient meets 0-1 RCRI criteria and therefore has a less than 1% risk of major adverse cardiac complications.  METS  The patient's functional capacity capacity is greater than 4 METS.  EKG  6/20/23  SR with short MO  VR 60  Heart Failure  The patient has no known history of heart failure.  Additionally, the patient reports no symptoms of heart failure and demonstrates no signs of heart failure.  Hypertension Evaluation  The patient has a known history of hypertension that is controlled on metoprolol    Cardiology Evaluation  The patient is not followed by cardiology.    MILI score which indicates a 0.3% risk of intraoperative or 30-day postoperative.    Pulmonary   The patient has findings on chart review, clinical presentation and evaluation significant for  [place].    The patient has a stop bang score of 2, which places patient at low risk for having [place].    ARISCAT 18, low, 1.6% risk of in-hospital postoperative pulmonary complications  PRODIGY 13, intermediate risk of respiratory depression episode. Patient given PI sheet for preoperative deep breathing exercises.    Hematology    Antiplatelet management   The patient is not currently receiving antiplatelet therapy.  Anticoagulation management  The patient is not currently receiving anticoagulation therapy. Patient provided with DVT educational handout.      Caprini score 7, high risk of perioperative VTE.     Patient instructed to ambulate as soon as possible postoperatively to decrease thromboembolic risk. Initiate mechanical DVT prophylaxis as soon as possible and initiate chemical prophylaxis when deemed safe from a bleeding standpoint post surgery.     Transfusion Evaluation  A type and screen was obtained given the likelihood for perioperative transfusion of blood or blood products.    Gastrointestinal  The patient has diagnoses or significant findings on chart review or clinical presentation and evaluation significant for Hiatal hernia with gastroesophageal reflux disease without esophagitis, functional dyspepsia on pepcid and protonix.  Eat 10- 0,  self-perceived oropharyngeal dysphagia scale (0-40)   12/19/23 CT abdomen pelvis  FINDINGS:  LOWER CHEST:  Images through the lung bases  demonstrate mild areas of atelectasis  and/or scarring.      ABDOMEN AND PELVIS:      LIVER:  Within normal limits.      BILE DUCTS:  Not abnormally dilated.      GALLBLADDER:  The gallbladder is surgically absent.      PANCREAS:  Appears unremarkable.      SPLEEN:  Appears unremarkable.      ADRENAL GLANDS:  Appear unremarkable.      KIDNEYS, URETERS, AND BLADDER:  The kidneys enhance with contrast material symmetrically. There is no  evidence of hydronephrosis.  The urinary bladder appears grossly  unremarkable.       BOWEL:  Colonic diverticulosis. No evidence of diverticulitis. Small bowel  loops appear normal in thickness and caliber. There is no evidence of  a bowel obstruction.  Appendix is normal in caliber.  Although CT has  limited sensitivity and specificity for gastric pathology, the  stomach appears grossly unremarkable.      RETROPERITONEUM, VESSELS:  There is no aneurysmal dilatation of the abdominal aorta. The IVC is  within normal limits.  There are atherosclerotic calcifications of  the aorta and its branches. No pathologically enlarged  retroperitoneal lymph nodes are noted.      PERITONEUM:  There is no evidence of pneumoperitoneum.  No ascites or loculated  fluid collection noted. Uterus is unchanged in size. Bilateral tubal  ligation clips are noted. No pathologically enlarged mesenteric lymph  nodes are identified.      ABDOMINAL WALL, SOFT TISSUES:  No acute process. Small fat containing umbilical hernia and small fat  containing supraumbilical hernia at the midline. Small fat containing  inguinal hernias as well.      SKELETON:  Degenerative changes. Grade 1 spondylolisthesis of L4 on L5. No acute  process.      IMPRESSION:  No CT evidence of an acute intra-abdominal or pelvic process.  Incidental findings as above.    Genitourinary  No diagnoses or significant findings on chart review or clinical presentation and evaluation.    Renal  The patient has no known history of chronic kidney disease.    Musculoskeletal  The patient has diagnoses or significant findings on chart review or clinical presentation and evaluation significant for cervical disc disease.    Endocrine  Diabetes Evaluation  The patient has no history of diabetes mellitus.  Thyroid Disease Evaluation  The patient has no history of thyroid disease.      -Preoperative medication instructions were provided and reviewed with the patient.  Any additional testing or evaluation was explained to the patient.  NPO Instructions were discussed, and  the patient's questions were answered prior to conclusion of this encounter.

## 2024-05-04 DIAGNOSIS — K30 FUNCTIONAL DYSPEPSIA: ICD-10-CM

## 2024-05-07 DIAGNOSIS — K30 FUNCTIONAL DYSPEPSIA: ICD-10-CM

## 2024-05-07 RX ORDER — NORTRIPTYLINE HYDROCHLORIDE 10 MG/1
10 CAPSULE ORAL NIGHTLY
Qty: 30 CAPSULE | Refills: 1 | Status: SHIPPED | OUTPATIENT
Start: 2024-05-07 | End: 2024-05-25 | Stop reason: WASHOUT

## 2024-05-13 ENCOUNTER — ANESTHESIA EVENT (OUTPATIENT)
Dept: OPERATING ROOM | Facility: HOSPITAL | Age: 59
DRG: 328 | End: 2024-05-13
Payer: COMMERCIAL

## 2024-05-14 ENCOUNTER — APPOINTMENT (OUTPATIENT)
Dept: OPERATING ROOM | Facility: HOSPITAL | Age: 59
DRG: 328 | End: 2024-05-14
Payer: COMMERCIAL

## 2024-05-14 ENCOUNTER — ANESTHESIA (OUTPATIENT)
Dept: OPERATING ROOM | Facility: HOSPITAL | Age: 59
DRG: 328 | End: 2024-05-14
Payer: COMMERCIAL

## 2024-05-14 ENCOUNTER — HOSPITAL ENCOUNTER (INPATIENT)
Facility: HOSPITAL | Age: 59
LOS: 1 days | Discharge: HOME | DRG: 328 | End: 2024-05-15
Attending: SURGERY | Admitting: SURGERY
Payer: COMMERCIAL

## 2024-05-14 DIAGNOSIS — K44.9 HIATAL HERNIA WITH GASTROESOPHAGEAL REFLUX DISEASE WITHOUT ESOPHAGITIS: Primary | ICD-10-CM

## 2024-05-14 DIAGNOSIS — K21.9 HIATAL HERNIA WITH GASTROESOPHAGEAL REFLUX DISEASE WITHOUT ESOPHAGITIS: Primary | ICD-10-CM

## 2024-05-14 PROCEDURE — A43281 PR LAP, REPAIR PARAESOPHAGEAL HERNIA, INCL FUNDOPLASTY W/O MESH: Performed by: NURSE ANESTHETIST, CERTIFIED REGISTERED

## 2024-05-14 PROCEDURE — 2500000005 HC RX 250 GENERAL PHARMACY W/O HCPCS: Performed by: NURSE ANESTHETIST, CERTIFIED REGISTERED

## 2024-05-14 PROCEDURE — 3600000009 HC OR TIME - EACH INCREMENTAL 1 MINUTE - PROCEDURE LEVEL FOUR: Performed by: SURGERY

## 2024-05-14 PROCEDURE — 3700000002 HC GENERAL ANESTHESIA TIME - EACH INCREMENTAL 1 MINUTE: Performed by: SURGERY

## 2024-05-14 PROCEDURE — 2720000007 HC OR 272 NO HCPCS: Performed by: SURGERY

## 2024-05-14 PROCEDURE — 3700000001 HC GENERAL ANESTHESIA TIME - INITIAL BASE CHARGE: Performed by: SURGERY

## 2024-05-14 PROCEDURE — 1100000001 HC PRIVATE ROOM DAILY

## 2024-05-14 PROCEDURE — 2500000005 HC RX 250 GENERAL PHARMACY W/O HCPCS: Performed by: SURGERY

## 2024-05-14 PROCEDURE — 43281 LAP PARAESOPHAG HERN REPAIR: CPT | Performed by: SURGERY

## 2024-05-14 PROCEDURE — 0BQT4ZZ REPAIR DIAPHRAGM, PERCUTANEOUS ENDOSCOPIC APPROACH: ICD-10-PCS | Performed by: SURGERY

## 2024-05-14 PROCEDURE — 0DJ08ZZ INSPECTION OF UPPER INTESTINAL TRACT, VIA NATURAL OR ARTIFICIAL OPENING ENDOSCOPIC: ICD-10-PCS | Performed by: SURGERY

## 2024-05-14 PROCEDURE — 96372 THER/PROPH/DIAG INJ SC/IM: CPT | Performed by: SURGERY

## 2024-05-14 PROCEDURE — 7100000002 HC RECOVERY ROOM TIME - EACH INCREMENTAL 1 MINUTE: Performed by: SURGERY

## 2024-05-14 PROCEDURE — 2500000004 HC RX 250 GENERAL PHARMACY W/ HCPCS (ALT 636 FOR OP/ED): Performed by: NURSE ANESTHETIST, CERTIFIED REGISTERED

## 2024-05-14 PROCEDURE — 2500000004 HC RX 250 GENERAL PHARMACY W/ HCPCS (ALT 636 FOR OP/ED): Performed by: ANESTHESIOLOGY

## 2024-05-14 PROCEDURE — 2500000004 HC RX 250 GENERAL PHARMACY W/ HCPCS (ALT 636 FOR OP/ED): Performed by: SURGERY

## 2024-05-14 PROCEDURE — 3600000004 HC OR TIME - INITIAL BASE CHARGE - PROCEDURE LEVEL FOUR: Performed by: SURGERY

## 2024-05-14 PROCEDURE — 2780000003 HC OR 278 NO HCPCS: Performed by: SURGERY

## 2024-05-14 PROCEDURE — C9113 INJ PANTOPRAZOLE SODIUM, VIA: HCPCS | Performed by: SURGERY

## 2024-05-14 PROCEDURE — 0DV44ZZ RESTRICTION OF ESOPHAGOGASTRIC JUNCTION, PERCUTANEOUS ENDOSCOPIC APPROACH: ICD-10-PCS | Performed by: SURGERY

## 2024-05-14 PROCEDURE — 2500000005 HC RX 250 GENERAL PHARMACY W/O HCPCS: Performed by: ANESTHESIOLOGY

## 2024-05-14 PROCEDURE — 2500000001 HC RX 250 WO HCPCS SELF ADMINISTERED DRUGS (ALT 637 FOR MEDICARE OP): Performed by: SURGERY

## 2024-05-14 PROCEDURE — A43281 PR LAP, REPAIR PARAESOPHAGEAL HERNIA, INCL FUNDOPLASTY W/O MESH: Performed by: ANESTHESIOLOGY

## 2024-05-14 PROCEDURE — 7100000001 HC RECOVERY ROOM TIME - INITIAL BASE CHARGE: Performed by: SURGERY

## 2024-05-14 RX ORDER — KETOROLAC TROMETHAMINE 30 MG/ML
INJECTION, SOLUTION INTRAMUSCULAR; INTRAVENOUS AS NEEDED
Status: DISCONTINUED | OUTPATIENT
Start: 2024-05-14 | End: 2024-05-14

## 2024-05-14 RX ORDER — METOPROLOL TARTRATE 1 MG/ML
5 INJECTION, SOLUTION INTRAVENOUS EVERY 6 HOURS
Status: DISCONTINUED | OUTPATIENT
Start: 2024-05-14 | End: 2024-05-15 | Stop reason: HOSPADM

## 2024-05-14 RX ORDER — KETOROLAC TROMETHAMINE 30 MG/ML
15 INJECTION, SOLUTION INTRAMUSCULAR; INTRAVENOUS EVERY 6 HOURS SCHEDULED
Status: DISCONTINUED | OUTPATIENT
Start: 2024-05-14 | End: 2024-05-15 | Stop reason: HOSPADM

## 2024-05-14 RX ORDER — FENTANYL CITRATE 50 UG/ML
INJECTION, SOLUTION INTRAMUSCULAR; INTRAVENOUS AS NEEDED
Status: DISCONTINUED | OUTPATIENT
Start: 2024-05-14 | End: 2024-05-14

## 2024-05-14 RX ORDER — GABAPENTIN 300 MG/1
300 CAPSULE ORAL ONCE
Status: DISCONTINUED | OUTPATIENT
Start: 2024-05-14 | End: 2024-05-14 | Stop reason: HOSPADM

## 2024-05-14 RX ORDER — ONDANSETRON HYDROCHLORIDE 2 MG/ML
INJECTION, SOLUTION INTRAVENOUS AS NEEDED
Status: DISCONTINUED | OUTPATIENT
Start: 2024-05-14 | End: 2024-05-14

## 2024-05-14 RX ORDER — ONDANSETRON HYDROCHLORIDE 2 MG/ML
4 INJECTION, SOLUTION INTRAVENOUS ONCE AS NEEDED
Status: CANCELLED | OUTPATIENT
Start: 2024-05-14

## 2024-05-14 RX ORDER — HYDROMORPHONE HYDROCHLORIDE 2 MG/ML
INJECTION, SOLUTION INTRAMUSCULAR; INTRAVENOUS; SUBCUTANEOUS AS NEEDED
Status: DISCONTINUED | OUTPATIENT
Start: 2024-05-14 | End: 2024-05-14

## 2024-05-14 RX ORDER — GABAPENTIN 250 MG/5ML
100 SOLUTION ORAL EVERY 12 HOURS SCHEDULED
Status: DISCONTINUED | OUTPATIENT
Start: 2024-05-14 | End: 2024-05-15 | Stop reason: HOSPADM

## 2024-05-14 RX ORDER — SUCCINYLCHOLINE CHLORIDE 20 MG/ML
INJECTION INTRAMUSCULAR; INTRAVENOUS AS NEEDED
Status: DISCONTINUED | OUTPATIENT
Start: 2024-05-14 | End: 2024-05-14

## 2024-05-14 RX ORDER — METHOCARBAMOL 100 MG/ML
500 INJECTION, SOLUTION INTRAMUSCULAR; INTRAVENOUS EVERY 8 HOURS
Status: DISCONTINUED | OUTPATIENT
Start: 2024-05-14 | End: 2024-05-15 | Stop reason: HOSPADM

## 2024-05-14 RX ORDER — CEFAZOLIN 1 G/1
INJECTION, POWDER, FOR SOLUTION INTRAVENOUS AS NEEDED
Status: DISCONTINUED | OUTPATIENT
Start: 2024-05-14 | End: 2024-05-14

## 2024-05-14 RX ORDER — ACETAMINOPHEN 325 MG/1
650 TABLET ORAL ONCE
Status: COMPLETED | OUTPATIENT
Start: 2024-05-14 | End: 2024-05-14

## 2024-05-14 RX ORDER — OXYCODONE HYDROCHLORIDE 5 MG/1
5 TABLET ORAL EVERY 4 HOURS PRN
Status: DISCONTINUED | OUTPATIENT
Start: 2024-05-14 | End: 2024-05-14

## 2024-05-14 RX ORDER — ROCURONIUM BROMIDE 10 MG/ML
INJECTION, SOLUTION INTRAVENOUS AS NEEDED
Status: DISCONTINUED | OUTPATIENT
Start: 2024-05-14 | End: 2024-05-14

## 2024-05-14 RX ORDER — SODIUM CHLORIDE, SODIUM LACTATE, POTASSIUM CHLORIDE, CALCIUM CHLORIDE 600; 310; 30; 20 MG/100ML; MG/100ML; MG/100ML; MG/100ML
100 INJECTION, SOLUTION INTRAVENOUS CONTINUOUS
Status: DISCONTINUED | OUTPATIENT
Start: 2024-05-14 | End: 2024-05-14 | Stop reason: HOSPADM

## 2024-05-14 RX ORDER — DEXTROSE MONOHYDRATE, SODIUM CHLORIDE, AND POTASSIUM CHLORIDE 50; 1.49; 4.5 G/1000ML; G/1000ML; G/1000ML
40 INJECTION, SOLUTION INTRAVENOUS CONTINUOUS
Status: DISCONTINUED | OUTPATIENT
Start: 2024-05-14 | End: 2024-05-15 | Stop reason: HOSPADM

## 2024-05-14 RX ORDER — MIDAZOLAM HYDROCHLORIDE 1 MG/ML
INJECTION INTRAMUSCULAR; INTRAVENOUS AS NEEDED
Status: DISCONTINUED | OUTPATIENT
Start: 2024-05-14 | End: 2024-05-14

## 2024-05-14 RX ORDER — PANTOPRAZOLE SODIUM 40 MG/10ML
40 INJECTION, POWDER, LYOPHILIZED, FOR SOLUTION INTRAVENOUS DAILY
Status: DISCONTINUED | OUTPATIENT
Start: 2024-05-14 | End: 2024-05-15 | Stop reason: HOSPADM

## 2024-05-14 RX ORDER — BUPIVACAINE HYDROCHLORIDE 2.5 MG/ML
INJECTION, SOLUTION INFILTRATION; PERINEURAL AS NEEDED
Status: DISCONTINUED | OUTPATIENT
Start: 2024-05-14 | End: 2024-05-14 | Stop reason: HOSPADM

## 2024-05-14 RX ORDER — DROPERIDOL 2.5 MG/ML
0.62 INJECTION, SOLUTION INTRAMUSCULAR; INTRAVENOUS ONCE AS NEEDED
Status: DISCONTINUED | OUTPATIENT
Start: 2024-05-14 | End: 2024-05-14

## 2024-05-14 RX ORDER — LIDOCAINE HYDROCHLORIDE 20 MG/ML
INJECTION, SOLUTION INFILTRATION; PERINEURAL AS NEEDED
Status: DISCONTINUED | OUTPATIENT
Start: 2024-05-14 | End: 2024-05-14

## 2024-05-14 RX ORDER — CEFAZOLIN SODIUM 2 G/50ML
2 SOLUTION INTRAVENOUS EVERY 6 HOURS
Status: DISCONTINUED | OUTPATIENT
Start: 2024-05-14 | End: 2024-05-14

## 2024-05-14 RX ORDER — SODIUM CHLORIDE, SODIUM LACTATE, POTASSIUM CHLORIDE, CALCIUM CHLORIDE 600; 310; 30; 20 MG/100ML; MG/100ML; MG/100ML; MG/100ML
100 INJECTION, SOLUTION INTRAVENOUS CONTINUOUS
Status: CANCELLED | OUTPATIENT
Start: 2024-05-14

## 2024-05-14 RX ORDER — PHENYLEPHRINE HCL IN 0.9% NACL 0.4MG/10ML
SYRINGE (ML) INTRAVENOUS AS NEEDED
Status: DISCONTINUED | OUTPATIENT
Start: 2024-05-14 | End: 2024-05-14

## 2024-05-14 RX ORDER — ACETAMINOPHEN 160 MG/5ML
650 SOLUTION ORAL EVERY 6 HOURS
Status: DISCONTINUED | OUTPATIENT
Start: 2024-05-14 | End: 2024-05-15 | Stop reason: HOSPADM

## 2024-05-14 RX ORDER — SODIUM CHLORIDE, SODIUM LACTATE, POTASSIUM CHLORIDE, CALCIUM CHLORIDE 600; 310; 30; 20 MG/100ML; MG/100ML; MG/100ML; MG/100ML
100 INJECTION, SOLUTION INTRAVENOUS CONTINUOUS
Status: DISCONTINUED | OUTPATIENT
Start: 2024-05-14 | End: 2024-05-14

## 2024-05-14 RX ORDER — POLYETHYLENE GLYCOL 3350 17 G/17G
17 POWDER, FOR SOLUTION ORAL DAILY
Status: DISCONTINUED | OUTPATIENT
Start: 2024-05-14 | End: 2024-05-15 | Stop reason: HOSPADM

## 2024-05-14 RX ORDER — METOPROLOL TARTRATE 25 MG/1
25 TABLET, FILM COATED ORAL DAILY
COMMUNITY

## 2024-05-14 RX ORDER — ENOXAPARIN SODIUM 100 MG/ML
30 INJECTION SUBCUTANEOUS ONCE
Status: COMPLETED | OUTPATIENT
Start: 2024-05-14 | End: 2024-05-14

## 2024-05-14 RX ORDER — ONDANSETRON HYDROCHLORIDE 2 MG/ML
4 INJECTION, SOLUTION INTRAVENOUS ONCE AS NEEDED
Status: DISCONTINUED | OUTPATIENT
Start: 2024-05-14 | End: 2024-05-14

## 2024-05-14 RX ORDER — CEFAZOLIN SODIUM 2 G/100ML
2 INJECTION, SOLUTION INTRAVENOUS EVERY 6 HOURS
Status: COMPLETED | OUTPATIENT
Start: 2024-05-14 | End: 2024-05-15

## 2024-05-14 RX ORDER — ENOXAPARIN SODIUM 100 MG/ML
40 INJECTION SUBCUTANEOUS EVERY 24 HOURS
Status: DISCONTINUED | OUTPATIENT
Start: 2024-05-15 | End: 2024-05-15 | Stop reason: HOSPADM

## 2024-05-14 RX ORDER — HYDROMORPHONE HYDROCHLORIDE 2 MG/ML
0.4 INJECTION, SOLUTION INTRAMUSCULAR; INTRAVENOUS; SUBCUTANEOUS EVERY 4 HOURS PRN
Status: DISCONTINUED | OUTPATIENT
Start: 2024-05-14 | End: 2024-05-15

## 2024-05-14 RX ORDER — HYDROMORPHONE HYDROCHLORIDE 2 MG/ML
0.2 INJECTION, SOLUTION INTRAMUSCULAR; INTRAVENOUS; SUBCUTANEOUS EVERY 4 HOURS PRN
Status: DISCONTINUED | OUTPATIENT
Start: 2024-05-14 | End: 2024-05-14

## 2024-05-14 RX ORDER — PROPOFOL 10 MG/ML
INJECTION, EMULSION INTRAVENOUS CONTINUOUS PRN
Status: DISCONTINUED | OUTPATIENT
Start: 2024-05-14 | End: 2024-05-14

## 2024-05-14 RX ORDER — ONDANSETRON HYDROCHLORIDE 2 MG/ML
4 INJECTION, SOLUTION INTRAVENOUS EVERY 6 HOURS PRN
Status: DISCONTINUED | OUTPATIENT
Start: 2024-05-14 | End: 2024-05-15 | Stop reason: HOSPADM

## 2024-05-14 RX ADMIN — CEFAZOLIN 2 G: 1 INJECTION, POWDER, FOR SOLUTION INTRAMUSCULAR; INTRAVENOUS at 13:51

## 2024-05-14 RX ADMIN — ROCURONIUM BROMIDE 10 MG: 10 INJECTION, SOLUTION INTRAVENOUS at 13:47

## 2024-05-14 RX ADMIN — GABAPENTIN 100 MG: 250 SOLUTION ORAL at 20:50

## 2024-05-14 RX ADMIN — KETOROLAC TROMETHAMINE 15 MG: 30 INJECTION, SOLUTION INTRAMUSCULAR at 15:49

## 2024-05-14 RX ADMIN — ROCURONIUM BROMIDE 10 MG: 10 INJECTION, SOLUTION INTRAVENOUS at 15:04

## 2024-05-14 RX ADMIN — LIDOCAINE HYDROCHLORIDE 60 MG: 20 INJECTION, SOLUTION INFILTRATION; PERINEURAL at 13:47

## 2024-05-14 RX ADMIN — ONDANSETRON 4 MG: 2 INJECTION, SOLUTION INTRAMUSCULAR; INTRAVENOUS at 17:15

## 2024-05-14 RX ADMIN — SODIUM CHLORIDE, POTASSIUM CHLORIDE, SODIUM LACTATE AND CALCIUM CHLORIDE 100 ML/HR: 600; 310; 30; 20 INJECTION, SOLUTION INTRAVENOUS at 12:32

## 2024-05-14 RX ADMIN — FENTANYL CITRATE 25 MCG: 50 INJECTION, SOLUTION INTRAMUSCULAR; INTRAVENOUS at 14:41

## 2024-05-14 RX ADMIN — METHOCARBAMOL 500 MG: 100 INJECTION, SOLUTION INTRAMUSCULAR; INTRAVENOUS at 18:28

## 2024-05-14 RX ADMIN — Medication 80 MCG: at 14:16

## 2024-05-14 RX ADMIN — METOPROLOL TARTRATE 5 MG: 5 INJECTION INTRAVENOUS at 18:29

## 2024-05-14 RX ADMIN — ROCURONIUM BROMIDE 20 MG: 10 INJECTION, SOLUTION INTRAVENOUS at 13:50

## 2024-05-14 RX ADMIN — ACETAMINOPHEN 650 MG: 325 TABLET ORAL at 12:33

## 2024-05-14 RX ADMIN — ENOXAPARIN SODIUM 30 MG: 30 INJECTION SUBCUTANEOUS at 12:33

## 2024-05-14 RX ADMIN — Medication 2 L/MIN: at 16:00

## 2024-05-14 RX ADMIN — PANTOPRAZOLE SODIUM 40 MG: 40 INJECTION, POWDER, FOR SOLUTION INTRAVENOUS at 18:29

## 2024-05-14 RX ADMIN — ROCURONIUM BROMIDE 10 MG: 10 INJECTION, SOLUTION INTRAVENOUS at 15:33

## 2024-05-14 RX ADMIN — FENTANYL CITRATE 25 MCG: 50 INJECTION, SOLUTION INTRAMUSCULAR; INTRAVENOUS at 14:59

## 2024-05-14 RX ADMIN — POTASSIUM CHLORIDE, DEXTROSE MONOHYDRATE AND SODIUM CHLORIDE 100 ML/HR: 150; 5; 450 INJECTION, SOLUTION INTRAVENOUS at 18:29

## 2024-05-14 RX ADMIN — ROCURONIUM BROMIDE 20 MG: 10 INJECTION, SOLUTION INTRAVENOUS at 14:14

## 2024-05-14 RX ADMIN — KETOROLAC TROMETHAMINE 15 MG: 30 INJECTION, SOLUTION INTRAMUSCULAR at 18:28

## 2024-05-14 RX ADMIN — HYDROMORPHONE HYDROCHLORIDE 0.4 MG: 2 INJECTION, SOLUTION INTRAMUSCULAR; INTRAVENOUS; SUBCUTANEOUS at 15:23

## 2024-05-14 RX ADMIN — CEFAZOLIN SODIUM 2 G: 2 INJECTION, SOLUTION INTRAVENOUS at 18:28

## 2024-05-14 RX ADMIN — PROPOFOL 150 MG: 10 INJECTION, EMULSION INTRAVENOUS at 13:47

## 2024-05-14 RX ADMIN — SODIUM CHLORIDE, POTASSIUM CHLORIDE, SODIUM LACTATE AND CALCIUM CHLORIDE: 600; 310; 30; 20 INJECTION, SOLUTION INTRAVENOUS at 14:16

## 2024-05-14 RX ADMIN — PROPOFOL 25 MCG/KG/MIN: 10 INJECTION, EMULSION INTRAVENOUS at 13:50

## 2024-05-14 RX ADMIN — HYDROMORPHONE HYDROCHLORIDE 0.4 MG: 2 INJECTION, SOLUTION INTRAMUSCULAR; INTRAVENOUS; SUBCUTANEOUS at 16:31

## 2024-05-14 RX ADMIN — FENTANYL CITRATE 25 MCG: 50 INJECTION, SOLUTION INTRAMUSCULAR; INTRAVENOUS at 13:47

## 2024-05-14 RX ADMIN — SUCCINYLCHOLINE CHLORIDE 120 MG: 20 INJECTION, SOLUTION INTRAMUSCULAR; INTRAVENOUS at 13:47

## 2024-05-14 RX ADMIN — ROCURONIUM BROMIDE 10 MG: 10 INJECTION, SOLUTION INTRAVENOUS at 14:53

## 2024-05-14 RX ADMIN — Medication 80 MCG: at 13:56

## 2024-05-14 RX ADMIN — DEXAMETHASONE SODIUM PHOSPHATE 4 MG: 4 INJECTION INTRA-ARTICULAR; INTRALESIONAL; INTRAMUSCULAR; INTRAVENOUS; SOFT TISSUE at 13:51

## 2024-05-14 RX ADMIN — PROMETHAZINE HYDROCHLORIDE 6.25 MG: 25 INJECTION INTRAMUSCULAR; INTRAVENOUS at 17:15

## 2024-05-14 RX ADMIN — SUGAMMADEX 200 MG: 100 INJECTION, SOLUTION INTRAVENOUS at 16:22

## 2024-05-14 RX ADMIN — FENTANYL CITRATE 25 MCG: 50 INJECTION, SOLUTION INTRAMUSCULAR; INTRAVENOUS at 14:14

## 2024-05-14 RX ADMIN — ACETAMINOPHEN 650 MG: 650 SOLUTION ORAL at 18:28

## 2024-05-14 RX ADMIN — ONDANSETRON 4 MG: 2 INJECTION INTRAMUSCULAR; INTRAVENOUS at 15:48

## 2024-05-14 RX ADMIN — MIDAZOLAM HYDROCHLORIDE 2 MG: 1 INJECTION, SOLUTION INTRAMUSCULAR; INTRAVENOUS at 13:35

## 2024-05-14 SDOH — SOCIAL STABILITY: SOCIAL INSECURITY: WERE YOU ABLE TO COMPLETE ALL THE BEHAVIORAL HEALTH SCREENINGS?: YES

## 2024-05-14 SDOH — SOCIAL STABILITY: SOCIAL INSECURITY: DO YOU FEEL UNSAFE GOING BACK TO THE PLACE WHERE YOU ARE LIVING?: NO

## 2024-05-14 SDOH — SOCIAL STABILITY: SOCIAL INSECURITY: ARE THERE ANY APPARENT SIGNS OF INJURIES/BEHAVIORS THAT COULD BE RELATED TO ABUSE/NEGLECT?: NO

## 2024-05-14 SDOH — SOCIAL STABILITY: SOCIAL INSECURITY: HAVE YOU HAD THOUGHTS OF HARMING ANYONE ELSE?: NO

## 2024-05-14 SDOH — SOCIAL STABILITY: SOCIAL INSECURITY: ARE YOU OR HAVE YOU BEEN THREATENED OR ABUSED PHYSICALLY, EMOTIONALLY, OR SEXUALLY BY ANYONE?: NO

## 2024-05-14 SDOH — HEALTH STABILITY: MENTAL HEALTH: CURRENT SMOKER: 0

## 2024-05-14 SDOH — SOCIAL STABILITY: SOCIAL INSECURITY: ABUSE: ADULT

## 2024-05-14 SDOH — SOCIAL STABILITY: SOCIAL INSECURITY: HAVE YOU HAD ANY THOUGHTS OF HARMING ANYONE ELSE?: NO

## 2024-05-14 SDOH — SOCIAL STABILITY: SOCIAL INSECURITY: DO YOU FEEL ANYONE HAS EXPLOITED OR TAKEN ADVANTAGE OF YOU FINANCIALLY OR OF YOUR PERSONAL PROPERTY?: NO

## 2024-05-14 SDOH — SOCIAL STABILITY: SOCIAL INSECURITY: HAS ANYONE EVER THREATENED TO HURT YOUR FAMILY OR YOUR PETS?: NO

## 2024-05-14 SDOH — SOCIAL STABILITY: SOCIAL INSECURITY: DOES ANYONE TRY TO KEEP YOU FROM HAVING/CONTACTING OTHER FRIENDS OR DOING THINGS OUTSIDE YOUR HOME?: NO

## 2024-05-14 ASSESSMENT — PAIN - FUNCTIONAL ASSESSMENT
PAIN_FUNCTIONAL_ASSESSMENT: 0-10

## 2024-05-14 ASSESSMENT — ACTIVITIES OF DAILY LIVING (ADL)
DRESSING YOURSELF: NEEDS ASSISTANCE
TOILETING: NEEDS ASSISTANCE
LACK_OF_TRANSPORTATION: NO
HEARING - LEFT EAR: DIFFICULTY WITH NOISE
ADEQUATE_TO_COMPLETE_ADL: YES
HEARING - RIGHT EAR: DIFFICULTY WITH NOISE
PATIENT'S MEMORY ADEQUATE TO SAFELY COMPLETE DAILY ACTIVITIES?: YES
FEEDING YOURSELF: NEEDS ASSISTANCE
JUDGMENT_ADEQUATE_SAFELY_COMPLETE_DAILY_ACTIVITIES: YES
BATHING: NEEDS ASSISTANCE
GROOMING: NEEDS ASSISTANCE
ASSISTIVE_DEVICE: EYEGLASSES
WALKS IN HOME: NEEDS ASSISTANCE

## 2024-05-14 ASSESSMENT — COGNITIVE AND FUNCTIONAL STATUS - GENERAL
STANDING UP FROM CHAIR USING ARMS: A LITTLE
DRESSING REGULAR LOWER BODY CLOTHING: A LITTLE
DAILY ACTIVITIY SCORE: 20
WALKING IN HOSPITAL ROOM: A LITTLE
PATIENT BASELINE BEDBOUND: NO
DRESSING REGULAR UPPER BODY CLOTHING: A LITTLE
DAILY ACTIVITIY SCORE: 21
HELP NEEDED FOR BATHING: A LITTLE
STANDING UP FROM CHAIR USING ARMS: A LITTLE
MOBILITY SCORE: 21
CLIMB 3 TO 5 STEPS WITH RAILING: A LITTLE
CLIMB 3 TO 5 STEPS WITH RAILING: A LITTLE
TOILETING: A LITTLE
TOILETING: A LITTLE
DRESSING REGULAR LOWER BODY CLOTHING: A LITTLE
WALKING IN HOSPITAL ROOM: A LITTLE
MOBILITY SCORE: 21
HELP NEEDED FOR BATHING: A LITTLE

## 2024-05-14 ASSESSMENT — LIFESTYLE VARIABLES
AUDIT-C TOTAL SCORE: 0
HOW MANY STANDARD DRINKS CONTAINING ALCOHOL DO YOU HAVE ON A TYPICAL DAY: PATIENT DOES NOT DRINK
HOW OFTEN DO YOU HAVE 6 OR MORE DRINKS ON ONE OCCASION: NEVER
SKIP TO QUESTIONS 9-10: 1
AUDIT-C TOTAL SCORE: 0
HOW OFTEN DO YOU HAVE A DRINK CONTAINING ALCOHOL: NEVER

## 2024-05-14 ASSESSMENT — PAIN SCALES - GENERAL
PAINLEVEL_OUTOF10: 0 - NO PAIN

## 2024-05-14 ASSESSMENT — PATIENT HEALTH QUESTIONNAIRE - PHQ9
1. LITTLE INTEREST OR PLEASURE IN DOING THINGS: NOT AT ALL
SUM OF ALL RESPONSES TO PHQ9 QUESTIONS 1 & 2: 0
2. FEELING DOWN, DEPRESSED OR HOPELESS: NOT AT ALL

## 2024-05-14 NOTE — ANESTHESIA PROCEDURE NOTES
Airway  Date/Time: 5/14/2024 1:48 PM  Urgency: elective    Airway not difficult    Staffing  Performed: CRNA   Authorized by: Xu Alejandro MD    Performed by: PIEDAD Spencer-DEBBIE  Patient location during procedure: OR    Indications and Patient Condition  Indications for airway management: anesthesia  Sedation level: deep  Preoxygenated: yes  Patient position: sniffing  Mask difficulty assessment: 0 - not attempted  Planned trial extubation    Final Airway Details  Final airway type: endotracheal airway      Successful airway: ETT  Cuffed: yes   Successful intubation technique: video laryngoscopy (basurto)  Facilitating devices/methods: intubating stylet  Endotracheal tube insertion site: oral  Blade size: #3  ETT size (mm): 7.0  Cormack-Lehane Classification: grade I - full view of glottis  Placement verified by: capnometry   Cuff volume (mL): 7  Measured from: lips  ETT to lips (cm): 21  Number of attempts at approach: 1

## 2024-05-14 NOTE — CARE PLAN
The patient's goals for the shift include      The clinical goals for the shift include managing pain; assessing the surgical site; assessing and maintaining fluid and electrolyte balance      Problem: Pain  Goal: My pain/discomfort is manageable  Outcome: Progressing     Problem: Safety  Goal: Patient will be injury free during hospitalization  Outcome: Progressing  Goal: I will remain free of falls  Outcome: Progressing     Problem: Daily Care  Goal: Daily care needs are met  Outcome: Progressing     Problem: Psychosocial Needs  Goal: Demonstrates ability to cope with hospitalization/illness  Outcome: Progressing  Goal: Collaborate with me, my family, and caregiver to identify my specific goals  Outcome: Progressing  Flowsheets (Taken 5/14/2024 1814)  Cultural Requests During Hospitalization: none  Spiritual Requests During Hospitalization: none     Problem: Discharge Barriers  Goal: My discharge needs are met  Outcome: Progressing

## 2024-05-14 NOTE — ANESTHESIA PREPROCEDURE EVALUATION
Patient: Marleni Davey    Procedure Information       Date/Time: 05/14/24 1330    Procedures:       REPAIR DIAPHRAGMATIC HERNIA LAPAROSCOPY      FUNDOPLICATION LAPAROSCOPY    Location: [facility] / [facility]    Surgeons: Siva Jones MD          Vitals:    05/14/24 1155   BP: 119/81   Pulse: 65   Resp: 16   Temp: 36.8 °C (98.2 °F)   SpO2: 99%       Past Surgical History:   Procedure Laterality Date    BREAST SURGERY Left     to unblock duct    BUNIONECTOMY      pin placed    CARDIAC CATHETERIZATION      approx 20yrs ago    CHOLECYSTECTOMY  06/15/2020    COLONOSCOPY      ESOPHAGOGASTRODUODENOSCOPY      KNEE ARTHROSCOPY W/ DEBRIDEMENT Right 2017    OVARIAN CYST REMOVAL      TUBAL LIGATION      UPPER GASTROINTESTINAL ENDOSCOPY      3/25/24     Past Medical History:   Diagnosis Date    Angina pectoris (CMS-HCC)     Anxiety     Arrhythmia     svt history    Bile salt-induced diarrhea (HHS-HCC)     Cervical disc disease     Class 2 obesity with body mass index (BMI) of 35.0 to 35.9 in adult 04/30/2024    Deviated septum     Dysphagia     GERD (gastroesophageal reflux disease)     Hiatal hernia     HTN (hypertension)     Hyperlipidemia     Migraines     [place] (obstructive sleep apnea)     has deviated septum,was unable to use cpap    PONV (postoperative nausea and vomiting)     with GA       Current Facility-Administered Medications:     lactated Ringer's infusion, 100 mL/hr, intravenous, Continuous, Xu Alejandro MD, Last Rate: 100 mL/hr at 05/14/24 1232, 100 mL/hr at 05/14/24 1232  Prior to Admission medications    Medication Sig Start Date End Date Taking? Authorizing Provider   ACETAMINOPHEN ORAL Take 2 tablets by mouth once daily as needed.   Yes Historical Provider, MD   metoprolol tartrate (Lopressor) 25 mg tablet Take 1 tablet (25 mg) by mouth once daily.   Yes Historical Provider, MD   nortriptyline (Pamelor) 10 mg capsule Take 1 capsule (10 mg) by mouth once daily at bedtime. 5/7/24  Yes  Gwen Niño MD   ondansetron ODT (Zofran-ODT) 4 mg disintegrating tablet Take 1 tablet (4 mg) by mouth every 6 hours. 3/27/23  Yes Historical Provider, MD   pantoprazole (ProtoNix) 40 mg EC tablet Take 1 tablet (40 mg) by mouth 2 times a day.   Yes Historical Provider, MD   butalbitaL-acetaminop-caf-cod (Fioricet W/Codeine) -73-30 mg capsule Take 1 capsule by mouth every 4 hours if needed.    Historical Provider, MD   butalbital-acetaminophen-caff (Fioricet) -40 mg capsule Take by mouth. 12/3/20   Historical Provider, MD   cyclobenzaprine (Flexeril) 10 mg tablet Take by mouth. 12/3/20   Historical Provider, MD   cyclobenzaprine (Flexeril) 5 mg tablet Take 1 tablet (5 mg) by mouth if needed.    Historical Provider, MD   famotidine (Pepcid) 40 mg tablet Take 1 tablet (40 mg) by mouth once daily. 2/27/24 4/27/24  Salina Jc PA-C   ibuprofen 200 mg tablet Take 2 tablets (400 mg) by mouth every 6 hours if needed.    Historical Provider, MD   metoprolol succinate XL (Toprol-XL) 50 mg 24 hr tablet Take 1 tablet (50 mg) by mouth 2 times a day. 2/13/18 5/14/24  Historical Provider, MD     Allergies   Allergen Reactions    Eletriptan Shortness of breath    Topiramate Other     Change in mental status      Zolmitriptan GI Upset    Trazodone Other     Tingling fingers      Sumatriptan Unknown    Triamcinolone Rash     Social History     Tobacco Use    Smoking status: Former     Current packs/day: 0.00     Types: Cigarettes     Quit date: 2009     Years since quitting: 15.3    Smokeless tobacco: Never   Substance Use Topics    Alcohol use: Yes     Alcohol/week: 1.0 standard drink of alcohol     Types: 1 Glasses of wine per week     Comment: occasionally         Chemistry    Lab Results   Component Value Date/Time     04/30/2024 1133    K 3.9 04/30/2024 1133     04/30/2024 1133    CO2 28 04/30/2024 1133    BUN 14 04/30/2024 1133    CREATININE 0.88 04/30/2024 1133    Lab Results    Component Value Date/Time    CALCIUM 9.2 04/30/2024 1133    ALKPHOS 73 04/30/2024 1133    AST 16 04/30/2024 1133    ALT 22 04/30/2024 1133    BILITOT 0.6 04/30/2024 1133          Lab Results   Component Value Date/Time    WBC 5.0 04/30/2024 1133    HGB 13.7 04/30/2024 1133    HCT 41.1 04/30/2024 1133     04/30/2024 1133     Lab Results   Component Value Date/Time    PROTIME 12.3 04/30/2024 1133    INR 1.1 04/30/2024 1133     No results found for this or any previous visit (from the past 4464 hour(s)).  No results found for this or any previous visit from the past 1095 days.    Relevant Problems   Cardiac   (+) Angina pectoris (CMS-HCC)   (+) Benign essential hypertension   (+) Cardiac rhythm disorder or disturbance or change   (+) Chest pain   (+) Hypertensive disorder      Neuro   (+) Anxiety      GI   (+) Dysphagia   (+) GERD (gastroesophageal reflux disease)   (+) Hiatal hernia with gastroesophageal reflux disease without esophagitis       Clinical information reviewed:    Allergies  Meds   Med Hx             NPO Detail:  NPO/Void Status  Date of Last Liquid: 05/13/24  Date of Last Solid: 05/13/24         Physical Exam    Airway  Mallampati: II     Cardiovascular - normal exam     Dental    Pulmonary    Abdominal            Anesthesia Plan    History of general anesthesia?: yes  History of complications of general anesthesia?: no    ASA 3     general     The patient is not a current smoker.  Patient was not previously instructed to abstain from smoking on day of procedure.  Patient did not smoke on day of procedure.  Education provided regarding risk of obstructive sleep apnea.  intravenous induction   Anesthetic plan and risks discussed with patient.    Plan discussed with CRNA.

## 2024-05-14 NOTE — OP NOTE
REPAIR DIAPHRAGMATIC HERNIA LAPAROSCOPY, FUNDOPLICATION LAPAROSCOPY     Operative Date: 05/14/24  Patient's Name: Marleni Davey  Patient's YOB: 1965  Patient's MRN: [id number]  Patient's Age: 59 y.o.  Operating Room Location: [facility]  Patient's ASA: III  Patient's Estimated Blood Loss: 20 mL        PREOPERATIVE DIAGNOSIS:   Refractory hiatal hernia        POSTOPERATIVE DIAGNOSIS:   Refractory hiatal hernia        OPERATION/PROCEDURE:   Laparoscopic hiatal hernia repair with toupet fundoplication  Esophagogastroduodenoscopy        SURGEON:   Siva Jones MD.         ASSISTANT:   Scrub assist.           INDICATIONS:   This is a 59 y.o. female who presented with a refractory hiatal hernia.  She has been diligent with lifestyle modifications and proton pump inhibitors for many years.  However, she was still having heartburn and reflux symptoms.  Her complete workup was consistent with refractory reflux being the source of her symptoms, associated with a hiatal hernia.  We therefore planned on a hiatal hernia repair with toupee fundoplication.        OPERATION:   The reasons for, benefits to, and risks of surgery were discussed with the patient.  The risks included, but not limited to, bleeding, infection, persistent pain, injury to surrounding structures, recurrent hiatal hernia, and postoperative abscess.  The patient appeared to understand and consented for surgery.  She was therefore brought back to the operating room and placed on the operating room table in the supine position with split legs.  Her abdomen was prepped and draped in a standard fashion.       We started with a 12 mm incision slightly to the left of midline 15 cm inferior to the xiphoid.  We dissected down to the anterior sheath and opened it.  We then bluntly dissected through the rectus muscle and opened the posterior sheath.  We then used our balloontipped Francois port to insufflate the  abdomen.  We then performed our bilateral transversus abdominis plane block, instilling 15 mL of half percent Marcaine in each transversus abdominis plane under direct visualization.  We then placed our 5 mm working port in the right upper quadrant, as well as a 12 mm working port in the left upper quadrant.  We then placed another 5 mm assistant port in the left lateral abdomen, and a 5 mm liver retractor port in the right lateral abdomen.  We then used a Nelsy pincer liver retractor to retract the left lateral lobe of the liver up and out of the way.  We placed the patient in reverse Trendelenburg positioning.    We then began dissecting out the pars flaccida.  We used our LigaSure to dissect through the pars flaccida.  We then dissected down to the medial edge of the right lara.  We then dissected out the right lara of the diaphragm.  We began bluntly dissecting out the right side of the mediastinum.  We then brought this posteriorly until we could see the left lara of the diaphragm.  We then went back anteriorly and dissected to the anterior aspect of the left lara of the diaphragm.  We then dissected out the left lara of the diaphragm.  We then had the esophagus completely dissected.  We completely reduced the stomach and the hernia sac.  We then bluntly dissected out the mediastinum circumferentially around the esophagus to be able to ensure at least 5 cm of intra-abdominal esophagus without tension.  We ensured to leave the anterior and posterior vagus nerves alone.    At this point, we had our gastroesophageal junction in the abdomen without any tension.  We therefore began closing the crura.  We used interrupted figure-of-eight 0 Ethibond sutures using the Endo Stitch.  We are onto our next let stitch, we passed a 54 [place] bougie through the esophagus into the stomach.  We ensured that our planned last stitch would not over tighten the diaphragm.  After doing so, we removed the bougie and placed our last  figure-of-eight suture.  We were satisfied that the diaphragm was snug, without being tight around the esophagus.    We then began our toupee fundoplication.  We previously had used our LigaSure to dissect the short gastrics off of the fundus and part of the body of the stomach.  This ensure that we have plenty of space posterior to the esophagus to do our posterior wrap.  We used a long Endo Stitch tacked to the fundus where our cut short gastrics were.  We used this as a grasper to pull the fundus of the stomach posterior to the esophagus.  We then performed a shoeshine maneuver.  We then performed a posterior 180 degree wrap, a toupet fundoplication.  We used 3 simple Ethibond sutures on either side of the esophagus using our Endo Stitch.  At the conclusion of this, we were satisfied that we had a good posterior toupee wrap while having a good closure of our hiatus.  We then performed our esophagogastroduodenoscopy which confirmed that the GE junction was in the stomach, and there was a good posterior wrap seen on retroflexion.    We then removed our liver retractor.  We removed her left upper quadrant 12 mm port, and closed the fascia with a figure-of-eight 0 Vicryl suture using a Ollie-Tomasz suture passer.  We then removed all remaining 5 mm ports under direct visualization.  We then desufflated the abdomen with the patient flat.  We closed the remaining 12 mm Francois port in 2 layers with running 0 Vicryl suture.  We then closed the skin of all of our port sites with subcuticular 4-0 Vicryl suture.  Dermabond is applied over top.        DISPOSITION:   The patient tolerated the procedure well.  There were no immediate complications.  She will be brought to the postanesthesia care unit. From there, she will be admitted for ongoing care.  She will be n.p.o. except for medications tonight, with a planned esophagram tomorrow.        I was present and scrubbed in for the entire procedure.      CPT Code:  61311    82995         Operating Room Staff:  Anesthesiologist: Xu Alejandro MD  CRNA: Jayshree Hunt, APRN-CRNA  Circulator: Anamaria Coughlin RN  Relief Scrub: Alexis Liu  Scrub Person: Mere Hughes  RNFA: MARIANO Verdugo MD  General Surgery  Office: [phone]  Fax:     [phone]  4:39 PM  05/14/24

## 2024-05-15 ENCOUNTER — APPOINTMENT (OUTPATIENT)
Dept: RADIOLOGY | Facility: HOSPITAL | Age: 59
DRG: 328 | End: 2024-05-15
Payer: COMMERCIAL

## 2024-05-15 ENCOUNTER — PHARMACY VISIT (OUTPATIENT)
Dept: PHARMACY | Facility: CLINIC | Age: 59
End: 2024-05-15
Payer: COMMERCIAL

## 2024-05-15 VITALS
WEIGHT: 174.16 LBS | HEIGHT: 59 IN | BODY MASS INDEX: 35.11 KG/M2 | SYSTOLIC BLOOD PRESSURE: 119 MMHG | OXYGEN SATURATION: 97 % | HEART RATE: 64 BPM | TEMPERATURE: 97.3 F | DIASTOLIC BLOOD PRESSURE: 79 MMHG | RESPIRATION RATE: 16 BRPM

## 2024-05-15 LAB
ALBUMIN SERPL BCP-MCNC: 3.6 G/DL (ref 3.4–5)
ANION GAP SERPL CALC-SCNC: 11 MMOL/L (ref 10–20)
BUN SERPL-MCNC: 11 MG/DL (ref 6–23)
CALCIUM SERPL-MCNC: 8.1 MG/DL (ref 8.6–10.3)
CHLORIDE SERPL-SCNC: 105 MMOL/L (ref 98–107)
CO2 SERPL-SCNC: 26 MMOL/L (ref 21–32)
CREAT SERPL-MCNC: 0.83 MG/DL (ref 0.5–1.05)
EGFRCR SERPLBLD CKD-EPI 2021: 81 ML/MIN/1.73M*2
ERYTHROCYTE [DISTWIDTH] IN BLOOD BY AUTOMATED COUNT: 12.1 % (ref 11.5–14.5)
GLUCOSE SERPL-MCNC: 116 MG/DL (ref 74–99)
HCT VFR BLD AUTO: 37.6 % (ref 36–46)
HGB BLD-MCNC: 12.3 G/DL (ref 12–16)
MAGNESIUM SERPL-MCNC: 1.78 MG/DL (ref 1.6–2.4)
MCH RBC QN AUTO: 31.9 PG (ref 26–34)
MCHC RBC AUTO-ENTMCNC: 32.7 G/DL (ref 32–36)
MCV RBC AUTO: 97 FL (ref 80–100)
NRBC BLD-RTO: 0 /100 WBCS (ref 0–0)
PHOSPHATE SERPL-MCNC: 2.8 MG/DL (ref 2.5–4.9)
PLATELET # BLD AUTO: 209 X10*3/UL (ref 150–450)
POTASSIUM SERPL-SCNC: 4.2 MMOL/L (ref 3.5–5.3)
RBC # BLD AUTO: 3.86 X10*6/UL (ref 4–5.2)
SODIUM SERPL-SCNC: 138 MMOL/L (ref 136–145)
WBC # BLD AUTO: 9.8 X10*3/UL (ref 4.4–11.3)

## 2024-05-15 PROCEDURE — 83735 ASSAY OF MAGNESIUM: CPT | Performed by: SURGERY

## 2024-05-15 PROCEDURE — 80069 RENAL FUNCTION PANEL: CPT | Performed by: SURGERY

## 2024-05-15 PROCEDURE — 36415 COLL VENOUS BLD VENIPUNCTURE: CPT | Performed by: SURGERY

## 2024-05-15 PROCEDURE — 85027 COMPLETE CBC AUTOMATED: CPT | Performed by: SURGERY

## 2024-05-15 PROCEDURE — RXMED WILLOW AMBULATORY MEDICATION CHARGE

## 2024-05-15 PROCEDURE — 2500000001 HC RX 250 WO HCPCS SELF ADMINISTERED DRUGS (ALT 637 FOR MEDICARE OP): Performed by: SURGERY

## 2024-05-15 PROCEDURE — 74220 X-RAY XM ESOPHAGUS 1CNTRST: CPT

## 2024-05-15 PROCEDURE — 2550000001 HC RX 255 CONTRASTS: Performed by: SURGERY

## 2024-05-15 PROCEDURE — 74220 X-RAY XM ESOPHAGUS 1CNTRST: CPT | Performed by: STUDENT IN AN ORGANIZED HEALTH CARE EDUCATION/TRAINING PROGRAM

## 2024-05-15 PROCEDURE — 2500000004 HC RX 250 GENERAL PHARMACY W/ HCPCS (ALT 636 FOR OP/ED): Performed by: SURGERY

## 2024-05-15 PROCEDURE — C9113 INJ PANTOPRAZOLE SODIUM, VIA: HCPCS | Performed by: SURGERY

## 2024-05-15 PROCEDURE — 94760 N-INVAS EAR/PLS OXIMETRY 1: CPT

## 2024-05-15 RX ORDER — MAGNESIUM SULFATE HEPTAHYDRATE 40 MG/ML
2 INJECTION, SOLUTION INTRAVENOUS ONCE
Status: COMPLETED | OUTPATIENT
Start: 2024-05-15 | End: 2024-05-15

## 2024-05-15 RX ORDER — OXYCODONE HYDROCHLORIDE 5 MG/1
5 TABLET ORAL EVERY 6 HOURS PRN
Qty: 10 TABLET | Refills: 0 | Status: SHIPPED | OUTPATIENT
Start: 2024-05-15 | End: 2024-05-31 | Stop reason: ALTCHOICE

## 2024-05-15 RX ORDER — OXYCODONE HYDROCHLORIDE 5 MG/1
5 TABLET ORAL EVERY 4 HOURS PRN
Status: DISCONTINUED | OUTPATIENT
Start: 2024-05-15 | End: 2024-05-15 | Stop reason: HOSPADM

## 2024-05-15 RX ORDER — CYCLOBENZAPRINE HCL 5 MG
5 TABLET ORAL 3 TIMES DAILY
Qty: 20 TABLET | Refills: 0 | Status: SHIPPED | OUTPATIENT
Start: 2024-05-15

## 2024-05-15 RX ORDER — POLYETHYLENE GLYCOL 3350 17 G/17G
17 POWDER, FOR SOLUTION ORAL DAILY
Qty: 238 G | Refills: 0 | Status: SHIPPED | OUTPATIENT
Start: 2024-05-16

## 2024-05-15 RX ORDER — ACETAMINOPHEN 325 MG/1
650 TABLET ORAL EVERY 6 HOURS
Qty: 60 TABLET | Refills: 0 | Status: SHIPPED | OUTPATIENT
Start: 2024-05-15

## 2024-05-15 RX ORDER — ACETAMINOPHEN 160 MG/5ML
650 SOLUTION ORAL EVERY 6 HOURS
Status: CANCELLED | OUTPATIENT
Start: 2024-05-15

## 2024-05-15 RX ORDER — GABAPENTIN 100 MG/1
100 CAPSULE ORAL 2 TIMES DAILY
Qty: 10 CAPSULE | Refills: 0 | Status: SHIPPED | OUTPATIENT
Start: 2024-05-15 | End: 2024-05-20

## 2024-05-15 RX ORDER — IBUPROFEN 600 MG/1
600 TABLET ORAL EVERY 6 HOURS
Qty: 30 TABLET | Refills: 0 | Status: SHIPPED | OUTPATIENT
Start: 2024-05-15

## 2024-05-15 RX ORDER — TRAMADOL HYDROCHLORIDE 50 MG/1
50 TABLET ORAL EVERY 4 HOURS PRN
Status: DISCONTINUED | OUTPATIENT
Start: 2024-05-15 | End: 2024-05-15 | Stop reason: HOSPADM

## 2024-05-15 RX ADMIN — MAGNESIUM SULFATE HEPTAHYDRATE 2 G: 2 INJECTION, SOLUTION INTRAVENOUS at 09:55

## 2024-05-15 RX ADMIN — ACETAMINOPHEN 650 MG: 650 SOLUTION ORAL at 00:03

## 2024-05-15 RX ADMIN — KETOROLAC TROMETHAMINE 15 MG: 30 INJECTION, SOLUTION INTRAMUSCULAR at 11:01

## 2024-05-15 RX ADMIN — ACETAMINOPHEN 650 MG: 650 SOLUTION ORAL at 05:29

## 2024-05-15 RX ADMIN — ACETAMINOPHEN 650 MG: 650 SOLUTION ORAL at 11:00

## 2024-05-15 RX ADMIN — PANTOPRAZOLE SODIUM 40 MG: 40 INJECTION, POWDER, FOR SOLUTION INTRAVENOUS at 09:52

## 2024-05-15 RX ADMIN — KETOROLAC TROMETHAMINE 15 MG: 30 INJECTION, SOLUTION INTRAMUSCULAR at 05:29

## 2024-05-15 RX ADMIN — POLYETHYLENE GLYCOL 3350 17 G: 17 POWDER, FOR SOLUTION ORAL at 09:52

## 2024-05-15 RX ADMIN — CEFAZOLIN SODIUM 2 G: 2 INJECTION, SOLUTION INTRAVENOUS at 05:29

## 2024-05-15 RX ADMIN — GABAPENTIN 100 MG: 250 SOLUTION ORAL at 09:51

## 2024-05-15 RX ADMIN — POTASSIUM CHLORIDE, DEXTROSE MONOHYDRATE AND SODIUM CHLORIDE 100 ML/HR: 150; 5; 450 INJECTION, SOLUTION INTRAVENOUS at 05:29

## 2024-05-15 RX ADMIN — METHOCARBAMOL 500 MG: 100 INJECTION, SOLUTION INTRAMUSCULAR; INTRAVENOUS at 09:57

## 2024-05-15 RX ADMIN — KETOROLAC TROMETHAMINE 15 MG: 30 INJECTION, SOLUTION INTRAMUSCULAR at 00:03

## 2024-05-15 RX ADMIN — CEFAZOLIN SODIUM 2 G: 2 INJECTION, SOLUTION INTRAVENOUS at 00:03

## 2024-05-15 RX ADMIN — DIATRIZOATE MEGLUMINE AND DIATRIZOATE SODIUM 30 ML: 660; 100 LIQUID ORAL; RECTAL at 08:46

## 2024-05-15 RX ADMIN — ENOXAPARIN SODIUM 40 MG: 40 INJECTION SUBCUTANEOUS at 09:51

## 2024-05-15 RX ADMIN — METHOCARBAMOL 500 MG: 100 INJECTION, SOLUTION INTRAMUSCULAR; INTRAVENOUS at 02:42

## 2024-05-15 ASSESSMENT — COGNITIVE AND FUNCTIONAL STATUS - GENERAL
DRESSING REGULAR LOWER BODY CLOTHING: A LITTLE
TOILETING: A LITTLE
WALKING IN HOSPITAL ROOM: A LITTLE
MOBILITY SCORE: 21
STANDING UP FROM CHAIR USING ARMS: A LITTLE
CLIMB 3 TO 5 STEPS WITH RAILING: A LITTLE
DAILY ACTIVITIY SCORE: 21
HELP NEEDED FOR BATHING: A LITTLE

## 2024-05-15 ASSESSMENT — PAIN SCALES - GENERAL
PAINLEVEL_OUTOF10: 3
PAINLEVEL_OUTOF10: 0 - NO PAIN

## 2024-05-15 ASSESSMENT — PAIN DESCRIPTION - LOCATION: LOCATION: ABDOMEN

## 2024-05-15 ASSESSMENT — PAIN DESCRIPTION - ORIENTATION: ORIENTATION: UPPER;LEFT

## 2024-05-15 NOTE — CARE PLAN
The patient's goals for the shift include      The clinical goals for the shift include Advanced her diet, monitor blood pressure      Problem: Pain  Goal: My pain/discomfort is manageable  5/15/2024 0747 by Arpita Booth RN  Outcome: Progressing  5/14/2024 1822 by Arpita Booth RN  Outcome: Progressing     Problem: Safety  Goal: Patient will be injury free during hospitalization  5/15/2024 0747 by Arpita Booth RN  Outcome: Progressing  5/14/2024 1822 by Arpita Booth RN  Outcome: Progressing  Goal: I will remain free of falls  5/15/2024 0747 by Arpita Booth RN  Outcome: Progressing  5/14/2024 1822 by Arpita Booth RN  Outcome: Progressing     Problem: Daily Care  Goal: Daily care needs are met  5/15/2024 0747 by Arpita Booth RN  Outcome: Progressing  5/14/2024 1822 by Arpita Booth RN  Outcome: Progressing     Problem: Psychosocial Needs  Goal: Demonstrates ability to cope with hospitalization/illness  5/15/2024 0747 by Arpita Booth RN  Outcome: Progressing  5/14/2024 1822 by Arpita Booth RN  Outcome: Progressing  Goal: Collaborate with me, my family, and caregiver to identify my specific goals  5/15/2024 0747 by Arpita Booth RN  Outcome: Progressing  5/14/2024 1822 by Arpita Booth RN  Outcome: Progressing  Flowsheets (Taken 5/14/2024 1814)  Cultural Requests During Hospitalization: none  Spiritual Requests During Hospitalization: none     Problem: Discharge Barriers  Goal: My discharge needs are met  5/15/2024 0747 by Arpita Booth RN  Outcome: Progressing  5/14/2024 1822 by Arpita Booth RN  Outcome: Progressing

## 2024-05-15 NOTE — ANESTHESIA POSTPROCEDURE EVALUATION
Patient: Marleni Davey    Procedure Summary       Date: 05/14/24 Room / Location: [facility]    Anesthesia Start: 1335 Anesthesia Stop: 1634    Procedures:       REPAIR DIAPHRAGMATIC HERNIA LAPAROSCOPY      FUNDOPLICATION LAPAROSCOPY Diagnosis:       Hiatal hernia with gastroesophageal reflux disease without esophagitis      (Hiatal hernia with gastroesophageal reflux disease without esophagitis [K44.9, K21.9])    Surgeons: Siva Jones MD Responsible Provider: Xu Alejandro MD    Anesthesia Type: general ASA Status: 3            Anesthesia Type: general    Vitals Value Taken Time   BP 92/60 05/15/24 0526   Temp 36.2 °C (97.2 °F) 05/15/24 0526   Pulse 2 05/15/24 0526   Resp 18 05/15/24 0526   SpO2 97 % 05/15/24 0726       Anesthesia Post Evaluation    Patient location during evaluation: PACU  Patient participation: complete - patient participated  Level of consciousness: awake  Pain management: adequate  Multimodal analgesia pain management approach  Airway patency: patent  Two or more strategies used to mitigate risk of obstructive sleep apnea  Cardiovascular status: acceptable  Respiratory status: acceptable  Hydration status: acceptable  Postoperative Nausea and Vomiting: none        No notable events documented.

## 2024-05-15 NOTE — PROGRESS NOTES
05/15/24 1251   Discharge Planning   Living Arrangements Spouse/significant other   Support Systems Spouse/significant other   Assistance Needed Patient is A&O X3, on room air at baseline, is independent with ADLs and uses no DME at home. Patient denies further needs upon discharge.   Type of Residence Private residence   Number of Stairs to Enter Residence 3   Number of Stairs Within Residence 2   Do you have animals or pets at home? Yes   Type of Animals or Pets 1 DOG   Who is requesting discharge planning? Provider   Home or Post Acute Services None   Patient expects to be discharged to: Home no needs   Does the patient need discharge transport arranged? No

## 2024-05-15 NOTE — DISCHARGE SUMMARY
Discharge Diagnosis  Hiatal hernia with gastroesophageal reflux disease without esophagitis    Issues Requiring Follow-Up  Follow up 2 weeks    Test Results Pending At Discharge  none      Hospital Course   Patient admitted after laparoscopic hiatal hernia repair with toupet fundoplication. Esophogram POD 1 without evidence of leak, contrast moves freely into stomach. Tolerated full liquids, pain controlled, + flatus, ambulatory. DC home POD 1 with full liquid diet x 2 weeks.    Pertinent Physical Exam At Time of Discharge  Physical Exam  Constitutional: No acute distress, sitting up in bed.  Neuro: Alert, oriented. Follows commands.   Eyes: EOMI. No scleral icterus. Conjunctiva pink.  ENT: MMM.   Heart: Regular rate.  Respiratory: No increased work of breathing or audible wheeze.  Abdomen: Soft, nondistended. Appropriately tender. Incisions clean, dry, intact.   MSK: Moves all extremities.  Vascular: Palpable pulses throughout, no pitting edema.  Skin: No rashes.   Psychological: Appropriate mood and behavior.     Home Medications     Medication List      START taking these medications     gabapentin 100 mg capsule; Commonly known as: Neurontin; Take 1 capsule   (100 mg) by mouth 2 times a day for 5 days.   oxyCODONE 5 mg immediate release tablet; Commonly known as: Roxicodone;   Take 1 tablet (5 mg) by mouth every 6 hours if needed for severe pain (7 -   10).   polyethylene glycol 17 gram packet; Commonly known as: Glycolax,   Miralax; Take 17 g by mouth once daily for 5 days.; Start taking on: May   16, 2024     CHANGE how you take these medications     acetaminophen 650 mg ER tablet; Commonly known as: Tylenol 8 HOUR; Take   1 tablet (650 mg) by mouth every 6 hours. Do not crush, chew, or split.;   What changed: medication strength, how much to take, when to take this,   reasons to take this, additional instructions   cyclobenzaprine 5 mg tablet; Commonly known as: Flexeril; Take 1 tablet   (5 mg) by mouth 3  times a day.; What changed: medication strength, how   much to take, when to take this, Another medication with the same name was   removed. Continue taking this medication, and follow the directions you   see here.   ibuprofen 600 mg tablet; Take 1 tablet (600 mg) by mouth every 6 hours.;   What changed: medication strength, how much to take, when to take this,   reasons to take this     CONTINUE taking these medications     butalbitaL-acetaminop-caf-cod -92-30 mg capsule; Commonly known   as: Fioricet W/Codeine   famotidine 40 mg tablet; Commonly known as: Pepcid; Take 1 tablet (40   mg) by mouth once daily.   Fioricet -40 mg capsule; Generic drug:   butalbital-acetaminophen-caff   metoprolol tartrate 25 mg tablet; Commonly known as: Lopressor   nortriptyline 10 mg capsule; Commonly known as: Pamelor; Take 1 capsule   (10 mg) by mouth once daily at bedtime.   ondansetron ODT 4 mg disintegrating tablet; Commonly known as:   Zofran-ODT   pantoprazole 40 mg EC tablet; Commonly known as: ProtoNix       Outpatient Follow-Up  Dr. Jones 2 weeks        Halle Samuel PA-C

## 2024-05-15 NOTE — DISCHARGE INSTRUCTIONS
PATIENT INSTRUCTIONS  Laparoscopic hiatal hernia repair    FOLLOW-UP: Please call when you get home to schedule a follow up appointment in 2-3 weeks. Call your physician immediately if you have any fevers greater than 103 degrees Fahrenheit, vomiting, increasing abdominal pain, difficulty swallowing etc.     WOUND CARE INSTRUCTIONS: Your incisions are closed with absorbable stitches and covered with glue. Glue will fall off in about 2 weeks. You are allowed to shower, wash area with warm, soapy water using your hand or gently washcloth. Try to avoid ointments on the wound unless directed to do so. Do not swim or tub soak for 2 weeks.    DIET: You will follow a full liquid diet for two weeks after surgery. Eat smaller portions and drink plenty of water. After two weeks, you may start adding in soft foods, which are any foods that you can cut with a fork. Do not drink through a straw after surgery until cleared by your surgeon.     ACTIVITY:  You are encouraged to walk and engage in light activity for the next two weeks. You should not lift more than 10 pounds for 6 weeks after surgery as it could put you at increased risk for a post-operative hernia.  Stairs are fine to use. Do not engage in strenuous activity for 6 weeks.    MEDICATIONS: Take Tylenol and Ibuprofen (if approved by your provider) every 6 hours for the first few days after surgery. Alternatively, you may alternate between these two medications every 3 hours. You are also provided a muscle relaxant for spasms and 5 days of a low dose gabapentin to help with nerve pain. You will be given a short script for oxycodone that you can take for breakthrough pain if needed. You should not drive, make important decisions, or operate machinery when taking narcotic pain medication.    QUESTIONS: Please feel free to call our office with any questions or concerns after you are discharged. Our office number is [phone].

## 2024-05-15 NOTE — PROGRESS NOTES
"General/Trauma Surgery Daily Progress Note    Subjective   Doing well, had esophogram this morning. Swallowed without issue. Up to bedside chair. Pain tolerable. + flatus. Pain with deep breath. Denies SOB, nausea.       Objective   Last Recorded Vitals:  Blood pressure 106/64, pulse 86, temperature 36.5 °C (97.7 °F), resp. rate 17, height 1.499 m (4' 11.02\"), weight 79 kg (174 lb 2.6 oz), SpO2 99%.    Intake/Output last 3 Shifts:  I/O last 3 completed shifts:  In: 2677.9 (33.9 mL/kg) [I.V.:1641.3 (20.8 mL/kg); IV Piggyback:1036.7]  Out: 720 (9.1 mL/kg) [Urine:700 (0.2 mL/kg/hr); Blood:20]  Weight: 79 kg     Pain Score:  Pain Score: 3     Physical Exam:  Constitutional: No acute distress, sitting up in bedside chair.   Neuro: Alert, oriented. Follows commands.   Eyes: EOMI. No scleral icterus. Conjunctiva pink.  ENT: MMM.   Heart: Regular rate.  Respiratory: No increased work of breathing or audible wheeze.  Abdomen: Soft, nondistended. Appropriately tender. Incisions clean, dry, intact.   MSK: Moves all extremities.  Vascular: Palpable pulses throughout, no pitting edema.  Skin: No rashes.   Psychological: Appropriate mood and behavior.            Relevant Results  Laboratory Results:  CBC:   Lab Results   Component Value Date    WBC 9.8 05/15/2024    RBC 3.86 (L) 05/15/2024    HGB 12.3 05/15/2024    HCT 37.6 05/15/2024     05/15/2024       RFP:   Lab Results   Component Value Date     05/15/2024    K 4.2 05/15/2024     05/15/2024    CO2 26 05/15/2024    BUN 11 05/15/2024    CREATININE 0.83 05/15/2024    CALCIUM 8.1 (L) 05/15/2024    MG 1.78 05/15/2024    PHOS 2.8 05/15/2024        LFTs:   Lab Results   Component Value Date    ALBUMIN 3.6 05/15/2024             Assessment/Plan   This is a 59 y.o. female POD 1 from laparoscopic hiatal hernia repair with toupet fundoplication. Doing well. Esophogram performed this morning.      Plan:   -- Full liquids x 2 weeks  -- Multimodal pain regimen   -- " IVF  -- DVT ppx 40 mg lovenox daily  -- Bowel regimen with Miralax  -- PPI daily   -- Likely discharge later today if tolerating full liquids well  -- Ambulate 5x per day in halls             Seen and discussed with Dr. Jones who is in agreement with plan. Please doc halo with questions.    Halle Samuel PA-C

## 2024-05-15 NOTE — CARE PLAN
Problem: Pain  Goal: My pain/discomfort is manageable  Outcome: Progressing     Problem: Safety  Goal: Patient will be injury free during hospitalization  Outcome: Progressing     Problem: Daily Care  Goal: Daily care needs are met  Outcome: Progressing     Problem: Psychosocial Needs  Goal: Demonstrates ability to cope with hospitalization/illness  Outcome: Progressing     Problem: Discharge Barriers  Goal: My discharge needs are met  Outcome: Progressing   The patient's goals for the shift include      The clinical goals for the shift include pain management

## 2024-05-20 NOTE — SIGNIFICANT EVENT
Follow Up Phone Call    Outgoing phone call    Spoke to: Marleni Davey Relationship:self   Phone number: [phone]      Outcome: I left a message on answering machine   No chief complaint on file.         Diagnosis:Not applicable

## 2024-05-20 NOTE — SIGNIFICANT EVENT
Follow Up Phone Call    Outgoing phone call    Spoke to: Marleni Davey Relationship:self   Phone number: [phone]      Outcome: contacted patient/ family   No chief complaint on file.         Diagnosis:Not applicable    States her dressing is dry and intact, denies fever or chills.  Bowels are moving.  Feeling better, no further questions.

## 2024-05-25 RX ORDER — NORTRIPTYLINE HYDROCHLORIDE 10 MG/1
10 CAPSULE ORAL NIGHTLY
Qty: 30 CAPSULE | Refills: 0 | Status: SHIPPED | OUTPATIENT
Start: 2024-05-25

## 2024-05-29 ENCOUNTER — OFFICE VISIT (OUTPATIENT)
Dept: SURGERY | Facility: CLINIC | Age: 59
End: 2024-05-29
Payer: COMMERCIAL

## 2024-05-29 VITALS
BODY MASS INDEX: 33.67 KG/M2 | HEART RATE: 77 BPM | WEIGHT: 166.8 LBS | DIASTOLIC BLOOD PRESSURE: 79 MMHG | OXYGEN SATURATION: 96 % | SYSTOLIC BLOOD PRESSURE: 113 MMHG

## 2024-05-29 DIAGNOSIS — K44.9 HIATAL HERNIA: Primary | ICD-10-CM

## 2024-05-29 DIAGNOSIS — Z09 POSTOPERATIVE EXAMINATION: ICD-10-CM

## 2024-05-29 PROCEDURE — 3078F DIAST BP <80 MM HG: CPT | Performed by: PHYSICIAN ASSISTANT

## 2024-05-29 PROCEDURE — 1036F TOBACCO NON-USER: CPT | Performed by: PHYSICIAN ASSISTANT

## 2024-05-29 PROCEDURE — 99024 POSTOP FOLLOW-UP VISIT: CPT | Performed by: PHYSICIAN ASSISTANT

## 2024-05-29 PROCEDURE — 3074F SYST BP LT 130 MM HG: CPT | Performed by: PHYSICIAN ASSISTANT

## 2024-05-29 RX ORDER — CYCLOBENZAPRINE HCL 5 MG
5 TABLET ORAL 3 TIMES DAILY PRN
Qty: 21 TABLET | Refills: 0 | Status: SHIPPED | OUTPATIENT
Start: 2024-05-29 | End: 2024-06-05

## 2024-06-19 ENCOUNTER — APPOINTMENT (OUTPATIENT)
Dept: SURGERY | Facility: CLINIC | Age: 59
End: 2024-06-19
Payer: COMMERCIAL

## 2024-06-19 VITALS — BODY MASS INDEX: 33.26 KG/M2 | HEIGHT: 59 IN | OXYGEN SATURATION: 98 % | WEIGHT: 165 LBS | HEART RATE: 71 BPM

## 2024-06-19 DIAGNOSIS — K44.9 HIATAL HERNIA: Primary | ICD-10-CM

## 2024-06-19 PROCEDURE — 1036F TOBACCO NON-USER: CPT | Performed by: SURGERY

## 2024-06-19 PROCEDURE — 99024 POSTOP FOLLOW-UP VISIT: CPT | Performed by: SURGERY

## 2024-06-19 NOTE — PROGRESS NOTES
"General Surgery Follow-Up Note    Referring Provider:   Patricia Taylor MD      Chief Complaint:  Hiatal hernia repair    History of Present Illness:  This is a 59 y.o. female who presents for follow-up of hiatal hernia repair.  She was last seen 2 weeks ago.  Overall she is doing well.  In general she is tolerating a diet.  She reports resolution of her heartburn symptoms.  She occasionally gets some solid food feeling like it sticks in her esophagus.      Vitals:   Vitals:    06/19/24 0824   Pulse: 71   SpO2: 98%   Weight: 74.8 kg (165 lb)   Height: 1.499 m (4' 11\")         Physical Exam:  General: No acute distress. Sitting up in bed.   Neuro: Alert and oriented ×3. Follows commands.  Head: Atraumatic  Eyes: Pupils equal reactive to light. Extraocular motions intact.  Ears: Hears normal speaking voice.  Mouth, Nose, Throat: Mucous membranes moist.  Normal dentition.  Neck: Supple. No appreciable masses.  Chest: No crepitus.  No appreciable scars.  Heart: Regular rate and rhythm.  Lung: Clear to auscultation bilaterally.  Vascular: No carotid bruits.  Palpable radial pulses bilaterally.  Abdomen: Soft. Nondistended. Nontender.  Well-healed incisions  Musculoskeletal: Moves all extremities.  Normal range of motion.  Lymphatic: No palpable lymph nodes.  Skin: No rashes or lesions.  Psychological: Normal affect    Labs:  CBC:   Lab Results   Component Value Date    WBC 9.8 05/15/2024    RBC 3.86 (L) 05/15/2024    HGB 12.3 05/15/2024    HCT 37.6 05/15/2024     05/15/2024       RFP:   Lab Results   Component Value Date     05/15/2024    K 4.2 05/15/2024     05/15/2024    CO2 26 05/15/2024    BUN 11 05/15/2024    CREATININE 0.83 05/15/2024    CALCIUM 8.1 (L) 05/15/2024    MG 1.78 05/15/2024    PHOS 2.8 05/15/2024        LFTs:   Lab Results   Component Value Date    PROT 6.9 04/30/2024    ALBUMIN 3.6 05/15/2024    BILITOT 0.6 04/30/2024    BILIDIR 0.1 04/30/2024    ALKPHOS 73 04/30/2024    AST 16 " 04/30/2024    ALT 22 04/30/2024            Imaging: I have personally reviewed the images and the radiologist's report.  No results found.      Assessment:  This is a 59 y.o.-year-old female who presents for follow-up of laparoscopic hiatal hernia repair with toupee fundoplication.  She is doing well.      Plan:  -- No lifting more than 10 pounds until 6 weeks after surgery, which would be about another 2 weeks  -- Stop antiacids.  Restart them if heartburn returns.  -- Okay to swim  -- Advance diet as tolerated  -- Follow-up as needed    Kvng Jones MD  General Surgery  Office: [phone]  Fax: [phone]  8:43 AM  06/19/24        Past Medical History:  Past Medical History:   Diagnosis Date    Angina pectoris (CMS-HCC)     Anxiety     Arrhythmia     svt history    Bile salt-induced diarrhea ([facility]-HCC)     Cervical disc disease     Class 2 obesity with body mass index (BMI) of 35.0 to 35.9 in adult 04/30/2024    Deviated septum     Dysphagia     GERD (gastroesophageal reflux disease)     Hiatal hernia     HTN (hypertension)     Hyperlipidemia     Migraines     [place] (obstructive sleep apnea)     has deviated septum,was unable to use cpap    PONV (postoperative nausea and vomiting)     with GA        Past Surgical History:  Past Surgical History:   Procedure Laterality Date    BREAST SURGERY Left     to unblock duct    BUNIONECTOMY      pin placed    CARDIAC CATHETERIZATION      approx 20yrs ago    CHOLECYSTECTOMY  06/15/2020    COLONOSCOPY      ESOPHAGOGASTRODUODENOSCOPY      HERNIA REPAIR  05/14/2024    1. Laparoscopic hiatal hernia repair with toupet fundoplication 2. Esophagogastroduodenoscopy    KNEE ARTHROSCOPY W/ DEBRIDEMENT Right 2017    OVARIAN CYST REMOVAL      TUBAL LIGATION      UPPER GASTROINTESTINAL ENDOSCOPY      3/25/24        Medications:  Current Outpatient Medications   Medication Instructions    acetaminophen (TYLENOL) 650 mg, oral, Every 6 hours    butalbitaL-acetaminop-caf-cod (Fioricet  W/Codeine) -65-30 mg capsule 1 capsule, oral, Every 4 hours PRN    butalbital-acetaminophen-caff (Fioricet) -40 mg capsule oral    cyclobenzaprine (FLEXERIL) 5 mg, oral, 3 times daily    cyclobenzaprine (FLEXERIL) 5 mg, oral, 3 times daily PRN    famotidine (PEPCID) 40 mg, oral, Daily    gabapentin (NEURONTIN) 100 mg, oral, 2 times daily    ibuprofen 600 mg, oral, Every 6 hours    metoprolol tartrate (LOPRESSOR) 25 mg, oral, Daily    nortriptyline (PAMELOR) 10 mg, oral, Nightly    ondansetron ODT (Zofran-ODT) 4 mg disintegrating tablet 1 tablet, oral, Every 6 hours    pantoprazole (PROTONIX) 40 mg, oral, 2 times daily    polyethylene glycol (GLYCOLAX, MIRALAX) 17 g, oral, Daily        Allergies:  Allergies   Allergen Reactions    Eletriptan Shortness of breath    Topiramate Other     Change in mental status      Zolmitriptan GI Upset    Trazodone Other     Tingling fingers      Sumatriptan Unknown    Triamcinolone Rash        Family History:  Family History   Problem Relation Name Age of Onset    Skin cancer Mother Tonie         Social History:  Social History     Socioeconomic History    Marital status:      Spouse name: Not on file    Number of children: Not on file    Years of education: Not on file    Highest education level: Not on file   Occupational History    Not on file   Tobacco Use    Smoking status: Former     Current packs/day: 0.00     Types: Cigarettes     Quit date: 2009     Years since quitting: 15.4    Smokeless tobacco: Never   Vaping Use    Vaping status: Never Used   Substance and Sexual Activity    Alcohol use: Yes     Alcohol/week: 1.0 standard drink of alcohol     Types: 1 Glasses of wine per week     Comment: occasionally    Drug use: Yes     Types: Marijuana     Comment: gummies for sleep prn    Sexual activity: Not on file   Other Topics Concern    Not on file   Social History Narrative    Not on file     Social Determinants of Health     Financial Resource Strain: Low  Risk  (5/14/2024)    Overall Financial Resource Strain (CARDIA)     Difficulty of Paying Living Expenses: Not very hard   Food Insecurity: No Food Insecurity (2/7/2023)    Received from [facility]    Hunger Vital Sign     Worried About Running Out of Food in the Last Year: Never true     Ran Out of Food in the Last Year: Never true   Transportation Needs: No Transportation Needs (5/14/2024)    PRAPARE - Transportation     Lack of Transportation (Medical): No     Lack of Transportation (Non-Medical): No   Physical Activity: Insufficiently Active (6/13/2024)    Received from [facility]    Exercise Vital Sign     Days of Exercise per Week: 2 days     Minutes of Exercise per Session: 20 min   Stress: Stress Concern Present (2/7/2023)    Received from [facility]    [place] [employer] - Occupational Stress Questionnaire     Feeling of Stress : To some extent   Social Connections: Socially Integrated (2/7/2023)    Received from [facility]    Social Connection and Isolation Panel [NHANES]     Frequency of Communication with Friends and Family: More than three times a week     Frequency of Social Gatherings with Friends and Family: Once a week     Attends Jewish Services: More than 4 times per year     Active Member of Clubs or Organizations: Yes     Attends Club or Organization Meetings: More than 4 times per year     Marital Status:    Intimate Partner Violence: Not on file   Housing Stability: Unknown (6/13/2024)    Received from [facility]    Housing Stability Vital Sign     Unable to Pay for Housing in the Last Year: No     Number of Places Lived in the Last Year: Not on file     Unstable Housing in the Last Year: No        Review of Systems:  A complete 12 point review of systems was performed and is negative except as noted in the history of present illness.

## 2024-12-12 ENCOUNTER — APPOINTMENT (OUTPATIENT)
Dept: PHYSICAL THERAPY | Facility: CLINIC | Age: 59
End: 2024-12-12
Payer: COMMERCIAL

## 2025-03-30 ENCOUNTER — HOSPITAL ENCOUNTER (EMERGENCY)
Facility: HOSPITAL | Age: 60
Discharge: HOME | End: 2025-03-30
Attending: STUDENT IN AN ORGANIZED HEALTH CARE EDUCATION/TRAINING PROGRAM
Payer: COMMERCIAL

## 2025-03-30 ENCOUNTER — OFFICE VISIT (OUTPATIENT)
Dept: URGENT CARE | Age: 60
End: 2025-03-30
Payer: COMMERCIAL

## 2025-03-30 ENCOUNTER — APPOINTMENT (OUTPATIENT)
Dept: RADIOLOGY | Facility: HOSPITAL | Age: 60
End: 2025-03-30
Payer: COMMERCIAL

## 2025-03-30 VITALS
WEIGHT: 165 LBS | HEIGHT: 59 IN | SYSTOLIC BLOOD PRESSURE: 120 MMHG | BODY MASS INDEX: 33.26 KG/M2 | RESPIRATION RATE: 16 BRPM | OXYGEN SATURATION: 98 % | DIASTOLIC BLOOD PRESSURE: 71 MMHG | HEART RATE: 67 BPM | TEMPERATURE: 97.6 F

## 2025-03-30 VITALS
RESPIRATION RATE: 17 BRPM | WEIGHT: 165 LBS | HEART RATE: 69 BPM | TEMPERATURE: 98.2 F | OXYGEN SATURATION: 96 % | SYSTOLIC BLOOD PRESSURE: 125 MMHG | HEIGHT: 59 IN | BODY MASS INDEX: 33.26 KG/M2 | DIASTOLIC BLOOD PRESSURE: 82 MMHG

## 2025-03-30 DIAGNOSIS — M79.606 LEG PAIN: Primary | ICD-10-CM

## 2025-03-30 DIAGNOSIS — M79.604 PAIN OF RIGHT LOWER EXTREMITY: ICD-10-CM

## 2025-03-30 DIAGNOSIS — M79.661 RIGHT CALF PAIN: Primary | ICD-10-CM

## 2025-03-30 PROCEDURE — 99499 UNLISTED E&M SERVICE: CPT | Performed by: REGISTERED NURSE

## 2025-03-30 PROCEDURE — 3078F DIAST BP <80 MM HG: CPT | Performed by: REGISTERED NURSE

## 2025-03-30 PROCEDURE — 3008F BODY MASS INDEX DOCD: CPT | Performed by: REGISTERED NURSE

## 2025-03-30 PROCEDURE — 1036F TOBACCO NON-USER: CPT | Performed by: REGISTERED NURSE

## 2025-03-30 PROCEDURE — 93971 EXTREMITY STUDY: CPT | Performed by: RADIOLOGY

## 2025-03-30 PROCEDURE — 3074F SYST BP LT 130 MM HG: CPT | Performed by: REGISTERED NURSE

## 2025-03-30 PROCEDURE — 99284 EMERGENCY DEPT VISIT MOD MDM: CPT | Mod: 25 | Performed by: STUDENT IN AN ORGANIZED HEALTH CARE EDUCATION/TRAINING PROGRAM

## 2025-03-30 PROCEDURE — 93971 EXTREMITY STUDY: CPT

## 2025-03-30 ASSESSMENT — LIFESTYLE VARIABLES
TOTAL SCORE: 0
HAVE PEOPLE ANNOYED YOU BY CRITICIZING YOUR DRINKING: NO
EVER FELT BAD OR GUILTY ABOUT YOUR DRINKING: NO
EVER HAD A DRINK FIRST THING IN THE MORNING TO STEADY YOUR NERVES TO GET RID OF A HANGOVER: NO
HAVE YOU EVER FELT YOU SHOULD CUT DOWN ON YOUR DRINKING: NO

## 2025-03-30 ASSESSMENT — PAIN DESCRIPTION - PAIN TYPE: TYPE: ACUTE PAIN

## 2025-03-30 ASSESSMENT — PAIN DESCRIPTION - DESCRIPTORS: DESCRIPTORS: THROBBING

## 2025-03-30 ASSESSMENT — PAIN - FUNCTIONAL ASSESSMENT: PAIN_FUNCTIONAL_ASSESSMENT: 0-10

## 2025-03-30 ASSESSMENT — PAIN SCALES - GENERAL: PAINLEVEL_OUTOF10: 4

## 2025-03-30 ASSESSMENT — PAIN DESCRIPTION - LOCATION: LOCATION: LEG

## 2025-03-30 ASSESSMENT — PAIN DESCRIPTION - ORIENTATION: ORIENTATION: RIGHT;POSTERIOR

## 2025-03-30 NOTE — ED TRIAGE NOTES
Pt here for R calf pain tenderness to a lump like area that when palpated causes throbbing pain up into the thigh. No erythema, edema, or warm to touch, no injury. No sob or thinners. Amb indep. Went to  PTA and told to come to ER.

## 2025-03-30 NOTE — PROGRESS NOTES
Subjective   Patient ID: Marleni Davey is a 60 y.o. female. They present today with a chief complaint of calf pain (Patient has pain in lower calf and has had pain since Friday and now has pain in the groin and feels a lump in calf right leg. Patient takes amobig injection for migraines ).    History of Present Illness  HPI    Past Medical History  Allergies as of 03/30/2025 - Reviewed 03/30/2025   Allergen Reaction Noted    Eletriptan Shortness of breath 10/17/2023    Topiramate Other 10/17/2023    Zolmitriptan GI Upset 10/17/2023    Trazodone Other 10/17/2023    Sumatriptan Unknown 10/17/2023    Triamcinolone Rash 10/17/2023       (Not in a hospital admission)       Past Medical History:   Diagnosis Date    Angina pectoris     Anxiety     Arrhythmia     svt history    Bile salt-induced diarrhea ([facility])     Cervical disc disease     Class 2 obesity with body mass index (BMI) of 35.0 to 35.9 in adult 04/30/2024    Deviated septum     Dysphagia     GERD (gastroesophageal reflux disease)     Hiatal hernia     HTN (hypertension)     Hyperlipidemia     Migraines     [place] (obstructive sleep apnea)     has deviated septum,was unable to use cpap    PONV (postoperative nausea and vomiting)     with GA       Past Surgical History:   Procedure Laterality Date    BREAST SURGERY Left     to unblock duct    BUNIONECTOMY      pin placed    CARDIAC CATHETERIZATION      approx 20yrs ago    CHOLECYSTECTOMY  06/15/2020    COLONOSCOPY      ESOPHAGOGASTRODUODENOSCOPY      HERNIA REPAIR  05/14/2024    1. Laparoscopic hiatal hernia repair with toupet fundoplication 2. Esophagogastroduodenoscopy    KNEE ARTHROSCOPY W/ DEBRIDEMENT Right 2017    OVARIAN CYST REMOVAL      TUBAL LIGATION      UPPER GASTROINTESTINAL ENDOSCOPY      3/25/24        reports that she quit smoking about 16 years ago. Her smoking use included cigarettes. She has never used smokeless tobacco. She reports current alcohol use of about 1.0 standard drink of alcohol  "per week. She reports current drug use. Drug: Marijuana.    Review of Systems  Review of Systems                               Objective    Vitals:    03/30/25 1642   BP: 120/71   BP Location: Left arm   Patient Position: Sitting   BP Cuff Size: Adult   Pulse: 67   Resp: 16   Temp: 36.4 °C (97.6 °F)   TempSrc: Oral   SpO2: 98%   Weight: 74.8 kg (165 lb)   Height: 1.499 m (4' 11\")     No LMP recorded. Patient is postmenopausal.    Physical Exam    Procedures    Point of Care Test & Imaging Results from this visit  No results found for this visit on 03/30/25.   Imaging  No results found.    Cardiology, Vascular, and Other Imaging  No other imaging results found for the past 2 days      Diagnostic study results (if any) were reviewed by Crystal L Severino, APRN-CNP.    Assessment/Plan   Allergies, medications, history, and pertinent labs/EKGs/Imaging reviewed by Crystal L Severino, APRN-CNP.     Medical Decision Making  60 yr old female presents with c/o right calf pain with a lump.  She states she noted the pain started on Friday while she was at work.  On Saturday she was in the car for over 8 hours.  Today she states the pain is slightly worse.  She reports she had a blood clot in her right leg as a small child but has not had any others since.  She denies any sob.  Posterior right calf is slightly warm to the touch and there is a notable small lump felt with palpation.  She denies any known injury.  Based on her presenting symptoms, patient is being referred to the ED.     Orders and Diagnoses  There are no diagnoses linked to this encounter.    Medical Admin Record      Patient disposition: ED    Electronically signed by Crystal L Severino, APRN-CNP  5:06 PM      "

## 2025-03-30 NOTE — ED PROVIDER NOTES
History/Exam limitations: none  HPI was provided by patient    HPI:    Chief Complaint   Patient presents with    Leg Pain        Marleni Davey is a 60 y.o. female presents with chief complaint of leg pain.  Pain is present to right calf and she feels a lump when palpated.  Described as throbbing anterior thigh.  With erythema.  Has been able to walk on it.  Initially was seen evaluated at urgent care and advised to come here to the ED.  Denies recent travel recent trauma recent immobilization hormonal use history of DVTs or PEs, unilateral leg swelling recent surgery.  States she has been traveling the past few months back and forth to [place] to visit grandchildren.  Symptoms she noticed ongoing the past few days.       ROS:  CONSTITUTIONAL:       fever, chills  ENT:       sore throat, congestion, rhinorrhea  CARDIOVASCULAR:       chest pain, palpitations, swelling  RESPIRATORY:       cough, wheeze, shortness of breath  GI:       nausea, vomiting, diarrhea, abdominal pain  GENITOURINARY:       dysuria, hematuria, frequency  MUSCULOSKELETAL:       deformity, neck pain  SKIN:       rash, lesion  NEUROLOGIC:       headache, numbness, focal weakness  NOTES: All systems reviewed, negative except as described above       Physical Exam:  GENERAL: Alert, oriented , cooperative,  in no acute distress.  HEAD: normocephalic, atraumatic  SKIN:  dry skin, no lesions.  PULMONARY: Clear bilaterally. No crackles, rhonchi, wheezing.  No respiratory distress.  No work of breathing.  CARDIAC: Regular rate and regular rhythm.  Pulses 2+ in radials and dorsal pedal pulses bilaterally.  No murmur, rub, gallop.  No edema.  ABDOMEN: Soft, nontender, active bowel sounds.  No palpable organomegaly.  No rebound or guarding.  No CVA tenderness.  No pulsatile masses.  MUSCULOSKELETAL: Full range of motion throughout, no deformity.  Negative Homans' sign  NEUROLOGICAL:  no focal neuro deficits.  Strength 5 out of 5 in bilateral lower  extremities.  Neurovascular intact in bilateral  lower extremities  PSYCHIATRIC: Appropriate mood and affect. Calm.       MDM/ED COURSE:    The patient presented for evaluation leg swelling. Differential include but not limited to cyst, abscess, contusion, lipoma, DVT.    Imaging studies if performed were independently reviewed and interpreted by myself and confirmed by radiologist.        I discussed the differential, results and plan with the patient and/or family/friend/caregiver if present. All questions answered.     I emphasized the importance of follow-up with the physician I referred them to in the timeframe recommended.  I explained reasons for the patient to return to the Emergency Department. Additional verbal discharge instructions were also given and discussed with the patient to supplement those generated by the EMR. We also discussed medications that were prescribed (if any) including common side effects and interactions. The patient was advised to abstain from driving, operating heavy machinery or making significant decisions while taking medications such as opiates and muscle relaxers that may impair this. All questions were addressed.  They understand return precautions and discharge instructions. The patient and/or family/friend/caregiver expressed understanding.     The patient  has stable vs and will be discharge home.   The patient and caregiver are in agreement with the plan and given instructions to follow up with their PCP.             I discussed the differential, results and plan with the patient and/or family/friend/caregiver if present.       I emphasized the importance of follow-up with the physician I referred them to in the timeframe recommended.  I explained reasons for the patient to return to the Emergency Department. Additional verbal discharge instructions were also given and discussed with the patient to supplement those generated by the EMR. We also discussed medications that  were prescribed (if any) including common side effects and interactions. The patient was advised to abstain from driving, operating heavy machinery or making significant decisions while taking medications such as opiates and muscle relaxers that may impair this. All questions were addressed.  They understand return precautions and discharge instructions. The patient and/or family/friend/caregiver expressed understanding.     Note: This note was dictated by speech recognition. Minor errors in transcription may be present.    ED Course as of 25 0542   Sun Mar 30, 2025   2056 Us shows No sonographic evidence for deep vein thrombosis within the evaluated  veins of the right lower extremity.   [WL]    Advised her to follow-up with her primary care physician for outpatient ultrasound if symptoms persist [WL]      ED Course User Index  [WL] David Conway,          Diagnoses as of 25 05   Pain of right lower extremity         Past Medical History:   Diagnosis Date    Angina pectoris     Anxiety     Arrhythmia     svt history    Bile salt-induced diarrhea ([facility])     Cervical disc disease     Class 2 obesity with body mass index (BMI) of 35.0 to 35.9 in adult 2024    Deviated septum     Dysphagia     GERD (gastroesophageal reflux disease)     Hiatal hernia     HTN (hypertension)     Hyperlipidemia     Migraines     [place] (obstructive sleep apnea)     has deviated septum,was unable to use cpap    PONV (postoperative nausea and vomiting)     with GA      Social History     Socioeconomic History    Marital status:    Tobacco Use    Smoking status: Former     Current packs/day: 0.00     Types: Cigarettes     Quit date:      Years since quittin.2    Smokeless tobacco: Never   Vaping Use    Vaping status: Never Used   Substance and Sexual Activity    Alcohol use: Yes     Alcohol/week: 1.0 standard drink of alcohol     Types: 1 Glasses of wine per week     Comment: occasionally    Drug  use: Yes     Types: Marijuana     Comment: gummies for sleep prn     Social Drivers of Health     Financial Resource Strain: Low Risk  (5/14/2024)    Overall Financial Resource Strain (CARDIA)     Difficulty of Paying Living Expenses: Not very hard   Food Insecurity: No Food Insecurity (2/7/2023)    Received from [facility]    Hunger Vital Sign     Worried About Running Out of Food in the Last Year: Never true     Ran Out of Food in the Last Year: Never true   Transportation Needs: No Transportation Needs (5/14/2024)    PRAPARE - Transportation     Lack of Transportation (Medical): No     Lack of Transportation (Non-Medical): No   Physical Activity: Insufficiently Active (6/13/2024)    Received from [facility]    Exercise Vital Sign     Days of Exercise per Week: 2 days     Minutes of Exercise per Session: 20 min   Stress: Stress Concern Present (2/7/2023)    Received from [facility] - Occupational Stress Questionnaire     Feeling of Stress : To some extent   Social Connections: Socially Integrated (2/7/2023)    Received from [facility]    Social Connection and Isolation Panel [NHANES]     Frequency of Communication with Friends and Family: More than three times a week     Frequency of Social Gatherings with Friends and Family: Once a week     Attends Mu-ism Services: More than 4 times per year     Active Member of Clubs or Organizations: Yes     Attends Club or Organization Meetings: More than 4 times per year     Marital Status:    Housing Stability: Unknown (6/13/2024)    Received from [facility]    Housing Stability Vital Sign     Unable to Pay for Housing in the Last Year: No     Unstable Housing in the Last Year: No     Current Outpatient Medications   Medication Instructions    acetaminophen (TYLENOL) 650 mg, oral, Every 6 hours     butalbitaL-acetaminop-caf-cod (Fioricet W/Codeine) -63-30 mg capsule 1 capsule, oral, Every 4 hours PRN    butalbital-acetaminophen-caff (Fioricet) -40 mg capsule Take by mouth.    cyclobenzaprine (FLEXERIL) 5 mg, oral, 3 times daily    cyclobenzaprine (FLEXERIL) 5 mg, oral, 3 times daily PRN    famotidine (PEPCID) 40 mg, oral, Daily    gabapentin (NEURONTIN) 100 mg, oral, 2 times daily    ibuprofen 600 mg, oral, Every 6 hours    metoprolol tartrate (LOPRESSOR) 25 mg, Daily    nortriptyline (PAMELOR) 10 mg, oral, Nightly    ondansetron ODT (Zofran-ODT) 4 mg disintegrating tablet 1 tablet, Every 6 hours    pantoprazole (PROTONIX) 40 mg, 2 times daily    polyethylene glycol (GLYCOLAX, MIRALAX) 17 g, oral, Daily     Allergies   Allergen Reactions    Eletriptan Shortness of breath    Topiramate Other     Change in mental status      Zolmitriptan GI Upset    Trazodone Other     Tingling fingers      Sumatriptan Unknown    Triamcinolone Rash             ED Triage Vitals [03/30/25 1741]   Temperature Heart Rate Respirations BP   36.8 °C (98.2 °F) 69 17 125/82      Pulse Ox Temp Source Heart Rate Source Patient Position   96 % Temporal Monitor Sitting      BP Location FiO2 (%)     Left arm --               Labs and Imaging  Vascular US lower extremity venous duplex right   Final Result   No sonographic evidence for deep vein thrombosis within the evaluated   veins of the right lower extremity.        MACRO:   None.        Signed by: Tere Solorzano 3/30/2025 7:58 PM   Dictation workstation:   [id number]        Labs Reviewed - No data to display        Procedure  Procedures                  David Conway DO  03/31/25 0542

## 2025-03-31 NOTE — DISCHARGE INSTRUCTIONS
Follow-up with your primary care physician this week and if symptoms persist it warrants another ultrasound to be performed.

## (undated) DEVICE — GOWN, ASTOUND, L

## (undated) DEVICE — CARE KIT, LAPAROSCOPIC, ADVANCED

## (undated) DEVICE — DEVICE, SUTURE, ENDOSTITCH, 10 MM

## (undated) DEVICE — Device

## (undated) DEVICE — NEEDLE, SAFETY, 21 G X 1.5 IN

## (undated) DEVICE — SPONGE, GAUZE, XRAY DECT, 16 PLY, 4 X 4, W/MASTER DMT,STERILE

## (undated) DEVICE — SUTURE, VICRYL, 4-0, 18 IN, PS2, UNDYED

## (undated) DEVICE — SCISSOR, MINI ENDO CUT, TIPS, DISP

## (undated) DEVICE — NEEDLE, HYPODERMIC, REGULAR WALL, REGULAR BEVEL, 22 G X 1.5 IN

## (undated) DEVICE — APPLICATOR, CHLORAPREP, W/ORANGE TINT, 26ML

## (undated) DEVICE — CAUTERY, PENCIL, PUSH BUTTON, SMOKE EVAC, 70MM

## (undated) DEVICE — DRAPE, INSTRUMENT, W/POUCH, STERI DRAPE, 9 5/8 X 18 LONG

## (undated) DEVICE — RELOAD, ENDOSTITCH, SURGIDAC, 0, 48 IN, GREEN, SULU

## (undated) DEVICE — SEALANT, HEMOSTATIC, FLOSEAL, 10 ML

## (undated) DEVICE — DRAPE, LEGGINGS, 48 X 31 IN, STERILE, LF

## (undated) DEVICE — SUTURE, VICRYL, 0, 27 IN, UR-6, VIOLET

## (undated) DEVICE — ASSEMBLY, STRYKER FLOW 2, SUCTION IRRIGATOR, WITH TIP

## (undated) DEVICE — TUBE SET, PNEUMOCLEAR, SMOKE EVACU, HIGH-FLOW

## (undated) DEVICE — APPLICATOR, ENDOSCOPIC FLOSEAL

## (undated) DEVICE — ACCESS SYS, KII SHIELDED BLADED, Z-THREAD, 5X100CM

## (undated) DEVICE — TOWEL PACK, STERILE, 4/PACK, BLUE

## (undated) DEVICE — TISSUE ADHESIVE, PREMIERPRO EXOFIN, PRECISION PEN HV, 1.0ML

## (undated) DEVICE — DEVICE, VOYANT, MARYLAND, 5MM X 37CM

## (undated) DEVICE — DRAIN, PENROSE, 1/4 IN X 18 IN, STERILE, LF

## (undated) DEVICE — DRAPE PACK, LAPAROSCOPIC CHOLECYSTECTOMY, CUSTOM, GEAUGA

## (undated) DEVICE — TROCAR SYSTEM, BALLOON, KII GELPORT, 12 X 100MM

## (undated) DEVICE — ACCESS SYS, KII SHIELDED BLADED, Z-THREAD, 12X100CM

## (undated) DEVICE — ELECTRODE, ELECTROSURGICAL, BLADE, INSULATED, ENT/IMA, STERILE

## (undated) DEVICE — TRAY, FOLEY, URINE METER, SURESTEP, 16FR, W/STATLOCK